# Patient Record
Sex: MALE | Race: WHITE | Employment: OTHER | URBAN - METROPOLITAN AREA
[De-identification: names, ages, dates, MRNs, and addresses within clinical notes are randomized per-mention and may not be internally consistent; named-entity substitution may affect disease eponyms.]

---

## 2017-03-02 ENCOUNTER — OFFICE VISIT (OUTPATIENT)
Dept: FAMILY MEDICINE | Facility: CLINIC | Age: 42
End: 2017-03-02
Payer: COMMERCIAL

## 2017-03-02 VITALS
HEART RATE: 88 BPM | OXYGEN SATURATION: 98 % | BODY MASS INDEX: 28.28 KG/M2 | WEIGHT: 213.38 LBS | HEIGHT: 73 IN | SYSTOLIC BLOOD PRESSURE: 122 MMHG | TEMPERATURE: 97.4 F | DIASTOLIC BLOOD PRESSURE: 86 MMHG

## 2017-03-02 DIAGNOSIS — R59.9 ENLARGED LYMPH NODES: ICD-10-CM

## 2017-03-02 DIAGNOSIS — B35.4 RINGWORM OF BODY: Primary | ICD-10-CM

## 2017-03-02 DIAGNOSIS — F90.0 ADHD, PREDOMINANTLY INATTENTIVE TYPE: ICD-10-CM

## 2017-03-02 LAB
BASOPHILS # BLD AUTO: 0 10E9/L (ref 0–0.2)
BASOPHILS NFR BLD AUTO: 0.4 %
DEPRECATED S PYO AG THROAT QL EIA: NORMAL
DIFFERENTIAL METHOD BLD: NORMAL
EOSINOPHIL # BLD AUTO: 0.1 10E9/L (ref 0–0.7)
EOSINOPHIL NFR BLD AUTO: 1.1 %
ERYTHROCYTE [DISTWIDTH] IN BLOOD BY AUTOMATED COUNT: 13.8 % (ref 10–15)
HCT VFR BLD AUTO: 48.6 % (ref 40–53)
HGB BLD-MCNC: 17.1 G/DL (ref 13.3–17.7)
LYMPHOCYTES # BLD AUTO: 1.8 10E9/L (ref 0.8–5.3)
LYMPHOCYTES NFR BLD AUTO: 33.5 %
MCH RBC QN AUTO: 30 PG (ref 26.5–33)
MCHC RBC AUTO-ENTMCNC: 35.2 G/DL (ref 31.5–36.5)
MCV RBC AUTO: 85 FL (ref 78–100)
MICRO REPORT STATUS: NORMAL
MONOCYTES # BLD AUTO: 0.6 10E9/L (ref 0–1.3)
MONOCYTES NFR BLD AUTO: 11.6 %
NEUTROPHILS # BLD AUTO: 2.9 10E9/L (ref 1.6–8.3)
NEUTROPHILS NFR BLD AUTO: 53.4 %
PLATELET # BLD AUTO: 174 10E9/L (ref 150–450)
RBC # BLD AUTO: 5.7 10E12/L (ref 4.4–5.9)
SPECIMEN SOURCE: NORMAL
WBC # BLD AUTO: 5.5 10E9/L (ref 4–11)

## 2017-03-02 PROCEDURE — 85025 COMPLETE CBC W/AUTO DIFF WBC: CPT | Performed by: PHYSICIAN ASSISTANT

## 2017-03-02 PROCEDURE — 87880 STREP A ASSAY W/OPTIC: CPT | Performed by: PHYSICIAN ASSISTANT

## 2017-03-02 PROCEDURE — 99214 OFFICE O/P EST MOD 30 MIN: CPT | Performed by: PHYSICIAN ASSISTANT

## 2017-03-02 PROCEDURE — 87081 CULTURE SCREEN ONLY: CPT | Performed by: PHYSICIAN ASSISTANT

## 2017-03-02 PROCEDURE — 36415 COLL VENOUS BLD VENIPUNCTURE: CPT | Performed by: PHYSICIAN ASSISTANT

## 2017-03-02 RX ORDER — PRENATAL VIT 91/IRON/FOLIC/DHA 28-975-200
COMBINATION PACKAGE (EA) ORAL 2 TIMES DAILY
Qty: 12 G | Refills: 1 | COMMUNITY
Start: 2017-03-02 | End: 2017-03-16

## 2017-03-02 RX ORDER — DEXTROAMPHETAMINE SACCHARATE, AMPHETAMINE ASPARTATE MONOHYDRATE, DEXTROAMPHETAMINE SULFATE AND AMPHETAMINE SULFATE 5; 5; 5; 5 MG/1; MG/1; MG/1; MG/1
20 CAPSULE, EXTENDED RELEASE ORAL DAILY
Qty: 30 CAPSULE | Refills: 0 | Status: SHIPPED | OUTPATIENT
Start: 2017-03-02 | End: 2017-05-30

## 2017-03-02 NOTE — PROGRESS NOTES
Tejas  Here are your recent results.  They are normal.  If you have any questions please do not hesitate to contact our office via phone ([phone]) or MyChart.    Toña Arias MS, PA-C  [facility]

## 2017-03-02 NOTE — NURSING NOTE
"Chief Complaint   Patient presents with     Mass     lump on right side of neck painful ~3 days        Initial /86  Pulse 88  Temp 97.4  F (36.3  C) (Tympanic)  Ht 6' 1\" (1.854 m)  Wt 213 lb 6 oz (96.8 kg)  SpO2 98%  BMI 28.15 kg/m2 Estimated body mass index is 28.15 kg/(m^2) as calculated from the following:    Height as of this encounter: 6' 1\" (1.854 m).    Weight as of this encounter: 213 lb 6 oz (96.8 kg).  Medication Reconciliation: complete   Jeannine Silva CMA      "

## 2017-03-02 NOTE — MR AVS SNAPSHOT
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  Normal or non-critical lab and imaging results will be communicated to you by MyChart, letter or phone within 4 business days after the clinic has received the results. If you do not hear from us within 7 days, please contact the clinic through [employer] or phone. If you have a critical or abnormal lab result, we will notify you by phone as soon as possible.  Submit refill requests through [employer] or call your pharmacy and they will forward the refill request to us. Please allow 3 business days for your refill to be completed.          Additional Information About Your Visit        MyChart Information     [employer] gives you secure access to your electronic health record. If you see a primary care provider, you can also send messages to your care team and make appointments. If you have questions, please call your primary care clinic.  If you do not have a primary care provider, please call [phone] and they will assist you.        Care EveryWhere ID     This is your Care EveryWhere ID. This could be used by other organizations to access your [place] medical records  [id number]        Your Vitals Were     Pulse Temperature Height Pulse Oximetry BMI (Body Mass Index)       88 97.4  F (36.3  C) (Tympanic) 6' 1\" (1.854 m) 98% 28.15 kg/m2        Blood " Pressure from Last 3 Encounters:   03/02/17 122/86   12/19/16 126/80   08/30/16 110/78    Weight from Last 3 Encounters:   03/02/17 213 lb 6 oz (96.8 kg)   12/19/16 200 lb (90.7 kg)   08/30/16 194 lb (88 kg)              We Performed the Following     Beta strep group A culture     CBC with platelets and differential     Strep, Rapid Screen          Today's Medication Changes          These changes are accurate as of: 3/2/17  9:43 AM.  If you have any questions, ask your nurse or doctor.               Start taking these medicines.        Dose/Directions    amphetamine-dextroamphetamine 20 MG per 24 hr capsule   Commonly known as:  ADDERALL XR   Used for:  ADHD, predominantly inattentive type   Replaces:  amphetamine-dextroamphetamine 30 MG per 24 hr capsule   Started by:  Toña Arias PA-C        Dose:  20 mg   Take 1 capsule (20 mg) by mouth daily   Quantity:  30 capsule   Refills:  0       terbinafine 1 % cream   Commonly known as:  lamISIL AT   Used for:  Ringworm of body   Started by:  Toña Arias PA-C        Apply topically 2 times daily for 14 days   Quantity:  12 g   Refills:  1         Stop taking these medicines if you haven't already. Please contact your care team if you have questions.     amphetamine-dextroamphetamine 30 MG per 24 hr capsule   Commonly known as:  ADDERALL XR   Replaced by:  amphetamine-dextroamphetamine 20 MG per 24 hr capsule   Stopped by:  Toña Arias PA-C                Where to get your medicines      Some of these will need a paper prescription and others can be bought over the counter.  Ask your nurse if you have questions.     Bring a paper prescription for each of these medications     amphetamine-dextroamphetamine 20 MG per 24 hr capsule       You don't need a prescription for these medications     terbinafine 1 % cream                Primary Care Provider Office Phone # Fax #    Candace Gomez [phone]       [place]  [facility] [address] [address]        Thank you!     Thank you for choosing [facility]  for your care. Our goal is always to provide you with excellent care. Hearing back from our patients is one way we can continue to improve our services. Please take a few minutes to complete the written survey that you may receive in the mail after your visit with us. Thank you!             Your Updated Medication List - Protect others around you: Learn how to safely use, store and throw away your medicines at www.disposemymeds.org.          This list is accurate as of: 3/2/17  9:43 AM.  Always use your most recent med list.                   Brand Name Dispense Instructions for use    amphetamine-dextroamphetamine 20 MG per 24 hr capsule    ADDERALL XR    30 capsule    Take 1 capsule (20 mg) by mouth daily       terbinafine 1 % cream    lamISIL AT    12 g    Apply topically 2 times daily for 14 days

## 2017-03-02 NOTE — PROGRESS NOTES
SUBJECTIVE:                                                    Terence Brewster is a 41 year old male who presents to clinic today for the following health issues:    Lump in Neck  Patient presents to clinic today with chief complaint of lump on right side of neck. Onset of symptoms was sudden ~3 days ago. Describes painful lump on right of neck with pain radiating into jaw, face, and temple. He denies sore throat, recent illness, or other known exposures. Has not tried any OTC medication or home remedies to attempt to alleviate pain however states that pain has mildly improved.     Skin Concerns  Patient complains of dry, scaly, and itchy patches on back. Onset of symptoms has been ongoing for >1 month. States that symptoms wax and wane. Patches are localized bilaterally just inferior to shoulder blades. Has tried OTC lotramin with little improvement of symptoms. Wanting to have skin evaluated today.       Problem list and histories reviewed & adjusted, as indicated.  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 intradermal nevus - had as long as can remember      Grief reaction- to wife's death 11/9/2013 from cholangiocarcinoma- ongoing     Elevated blood pressure     Adjustment disorder with mixed anxiety and depressed mood     ED (erectile dysfunction)     Heart palpitations     Lightheadedness     Elevated hemoglobin (H)     Past Surgical History   Procedure Laterality Date     Hernia repair, inguinal rt/lt Left 1978     left inguinal herniorrhaphy  "at 3 year of age     Vasectomy  2005       Social History   Substance Use Topics     Smoking status: Never Smoker     Smokeless tobacco: Never Used     Alcohol use Yes      Comment: occ.     Family History   Problem Relation Age of Onset     Lipids Father          Current Outpatient Prescriptions   Medication Sig Dispense Refill     amphetamine-dextroamphetamine (ADDERALL XR) 30 MG per 24 hr capsule Take 1 capsule (30 mg) by mouth daily 30 capsule 0     No Known Allergies    Reviewed and updated as needed this visit by clinical staff  Tobacco  Allergies  Meds       Reviewed and updated as needed this visit by Provider         ROS:  Constitutional, HEENT, cardiovascular, pulmonary, GI, , musculoskeletal, neuro, skin, endocrine and psych systems are negative, except as otherwise noted.    This document serves as a record of the services and decisions personally performed and made by Toña Arias PA-C. It was created on her behalf by Chata Mccormick, a trained medical scribe. The creation of this document is based the provider's statements to the medical scribe.  Chata Mccormick, March 2, 2017 9:21 AM    OBJECTIVE:                                                    /86  Pulse 88  Temp 97.4  F (36.3  C) (Tympanic)  Ht 6' 1\" (1.854 m)  Wt 213 lb 6 oz (96.8 kg)  SpO2 98%  BMI 28.15 kg/m2  Body mass index is 28.15 kg/(m^2).     GENERAL: healthy, alert and no distress  HENT: ear canals and TM's normal, nose and mouth without ulcers or lesions  NECK: very mild erythema of posterior pharynx, tender palpable anterior right cervical lymph node  RESP: lungs clear to auscultation - no rales, rhonchi or wheezes  CV: regular rate and rhythm, normal S1 S2, no S3 or S4, no murmur, click or rub, no peripheral edema and peripheral pulses strong  SKIN: scaly patches just inferior of bilateral shoulder blades c/w likely ring worm  NEURO: Normal strength and tone, mentation intact and speech normal  PSYCH: mentation appears normal, " affect normal/bright    Diagnostic Test Results:  No results found for this or any previous visit (from the past 24 hour(s)).     ASSESSMENT/PLAN:                                                    Terence was seen today for mass.    Diagnoses and all orders for this visit:    Ringworm of body  Discussed with patient that symptoms are consistent with ring worm. Begin using terbinafine BID as prescribed for 14 days.   -     terbinafine (LAMISIL AT) 1 % cream; Apply topically 2 times daily for 14 days    ADD, predominantly inattentive type- ok to be seen every 6 months- [facility] - 12/19/16  Patient mentions that he has not heart palpitations recently while at rest. As a result he has stopped taking his medication. However patient reports suffering symptoms of ADD as result of discontinuing medication. Restart taking amphetamine-dextroamphetamine however decrease from 30 mg XR to 20 mg XR. [facility].   -     amphetamine-dextroamphetamine (ADDERALL XR) 20 MG per 24 hr capsule; Take 1 capsule (20 mg) by mouth daily    Enlarged lymph nodes - discussed with patient likely viral and to alternate acetaminophen/ ibuprofen and push fluids, continue to ice if tenderness present  -     CBC with platelets and differential  -     Strep, Rapid Screen: negative       The information in this document, created by the medical scribe for me, accurately reflects the services I personally performed and the decisions made by me. I have reviewed and approved this document for accuracy prior to leaving the patient care area. Toña Arias PA-C March 2, 2017 9:20 AM    Toña Arias PA-C  [facility]

## 2017-03-04 LAB
BACTERIA SPEC CULT: NORMAL
MICRO REPORT STATUS: NORMAL
SPECIMEN SOURCE: NORMAL

## 2017-03-14 ENCOUNTER — MYC MEDICAL ADVICE (OUTPATIENT)
Dept: FAMILY MEDICINE | Facility: CLINIC | Age: 42
End: 2017-03-14

## 2017-03-14 DIAGNOSIS — B35.4 TINEA CORPORIS: Primary | ICD-10-CM

## 2017-03-14 RX ORDER — KETOCONAZOLE 20 MG/G
CREAM TOPICAL 2 TIMES DAILY
Qty: 60 G | Refills: 2 | Status: SHIPPED | OUTPATIENT
Start: 2017-03-14 | End: 2017-09-19

## 2017-03-14 NOTE — TELEPHONE ENCOUNTER
LOV 3/2/2017    Please see my chart message below     Please advise     Thank you     Sara Franco RN, BSN  [place] [facility]

## 2017-05-30 ENCOUNTER — MYC MEDICAL ADVICE (OUTPATIENT)
Dept: FAMILY MEDICINE | Facility: CLINIC | Age: 42
End: 2017-05-30

## 2017-05-30 DIAGNOSIS — F90.0 ADHD, PREDOMINANTLY INATTENTIVE TYPE: ICD-10-CM

## 2017-05-30 RX ORDER — DEXTROAMPHETAMINE SACCHARATE, AMPHETAMINE ASPARTATE MONOHYDRATE, DEXTROAMPHETAMINE SULFATE AND AMPHETAMINE SULFATE 5; 5; 5; 5 MG/1; MG/1; MG/1; MG/1
20 CAPSULE, EXTENDED RELEASE ORAL DAILY
Qty: 30 CAPSULE | Refills: 0 | Status: SHIPPED | OUTPATIENT
Start: 2017-05-30 | End: 2017-07-17

## 2017-05-30 NOTE — TELEPHONE ENCOUNTER
Controlled Substance Refill Request for Adderall 20 mg  Problem List Complete:  Yes    Last Written Prescription Date:  3/2/2017  Last Fill Quantity: 30,   # refills: 0    Last Office Visit with [facility] primary care provider: 3/2/2017 with Toña Arias, 10/27/2016 with Dr. Gomez    Clinic visit frequency required: Q 6  months     Future Office visit:     Controlled substance agreement on file: Yes:  Date 3/27/2013.     Processing:  Staff will hand deliver Rx to on-site pharmacy   checked in past 6 months?  Unknown    Emily Curtis, BOBBY, RN, PHN  [facility]

## 2017-05-30 NOTE — TELEPHONE ENCOUNTER
rx at [facility].  Please bring to me and I'll sign when back in office tomorrow.

## 2017-08-27 ENCOUNTER — MYC MEDICAL ADVICE (OUTPATIENT)
Dept: FAMILY MEDICINE | Facility: CLINIC | Age: 42
End: 2017-08-27

## 2017-08-27 DIAGNOSIS — F90.0 ADHD, PREDOMINANTLY INATTENTIVE TYPE: ICD-10-CM

## 2017-08-28 RX ORDER — DEXTROAMPHETAMINE SACCHARATE, AMPHETAMINE ASPARTATE MONOHYDRATE, DEXTROAMPHETAMINE SULFATE AND AMPHETAMINE SULFATE 5; 5; 5; 5 MG/1; MG/1; MG/1; MG/1
20 CAPSULE, EXTENDED RELEASE ORAL DAILY
Qty: 30 CAPSULE | Refills: 0 | Status: SHIPPED | OUTPATIENT
Start: 2017-08-28 | End: 2018-05-30

## 2017-08-28 NOTE — TELEPHONE ENCOUNTER
Controlled Substance Refill Request for Adderall    Problem List Complete:  Yes    Last Written Prescription Date:  7/17/2017  Last Fill Quantity: 30,   # refills: 0    Last Office Visit with [facility] [place] care provider: 3/2/2017    Clinic visit frequency required: Q 6  months     Future Office visit:     Controlled substance agreement on file: Yes:  Date 12/19/2016.     Processing:  Staff will hand deliver Rx to on-site pharmacy     checked in past 6 months?  Yes 7/17/2017     Routing refill request to provider for review/approval because:  Drug not on the [facility] refill protocol   Aline Gorman RN

## 2017-08-29 NOTE — TELEPHONE ENCOUNTER
done x 1 month only. Pt needs recheck office visit before more refills. Please inform pt and  Please assist pt in making appt to be seen.  rx walked over to  [facility] .   Can  through the drive-thru. Please inform patient.

## 2017-08-29 NOTE — TELEPHONE ENCOUNTER
Patient notified via mychart of PCP recommendations below.    Emily Curtis, BS, RN, N  [facility]) [phone]

## 2017-09-18 ENCOUNTER — TELEPHONE (OUTPATIENT)
Dept: FAMILY MEDICINE | Facility: CLINIC | Age: 42
End: 2017-09-18

## 2017-09-18 NOTE — TELEPHONE ENCOUNTER
"Patient calling stating he was at the dentist and had his BP checked and it was 128/99.     Sx: diaphoresis in the middle of the night (onset: couple months), chest pain every once in a while    DENIES: dizziness/lightheadedness, N/V, SOB, difficulty breathing, numbness/tingling, weakness    CHEST PAIN     Onset: few months    Description:   Location:  left side  Character: twinge  Radiation: to L armpit area  Duration: seconds and intermittent (if patient \"pokes\" the area, the pain goes away    Intensity: mild, moderate    Progression of Symptoms:  same and intermittent    Accompanying Signs & Symptoms:  Shortness of breath: no  Sweating: no  Nausea/vomiting: no  Lightheadedness: no  Palpitations: no  Fever/Chills: no   Cough: no  Heartburn: YES - goes away after drinking a glass of cold ice water   History:   Family history of heart disease YES- high cholesterol  Tobacco use: no    Precipitating factors:   Worse with exertion: no  Worse with deep breaths :  YES- sometimes  Related to food: no    Alleviating factors:  None           Therapies Tried and outcome: None     . [phone] (OK to )    Appointment made for 09/19/2017 with WILLIAM FONSECA.   Advised patient that if new or worsening symptoms appear (reviewed new & worsening symptoms) to be seen in the the ER    Elisha Mosher RN  [place] [facility]    "

## 2017-09-19 ENCOUNTER — OFFICE VISIT (OUTPATIENT)
Dept: FAMILY MEDICINE | Facility: CLINIC | Age: 42
End: 2017-09-19
Payer: COMMERCIAL

## 2017-09-19 VITALS
WEIGHT: 204 LBS | HEART RATE: 78 BPM | HEIGHT: 73 IN | TEMPERATURE: 97.9 F | BODY MASS INDEX: 27.04 KG/M2 | SYSTOLIC BLOOD PRESSURE: 130 MMHG | OXYGEN SATURATION: 98 % | DIASTOLIC BLOOD PRESSURE: 86 MMHG

## 2017-09-19 DIAGNOSIS — F90.0 ADHD, PREDOMINANTLY INATTENTIVE TYPE: ICD-10-CM

## 2017-09-19 DIAGNOSIS — R03.0 ELEVATED BLOOD PRESSURE READING WITHOUT DIAGNOSIS OF HYPERTENSION: Primary | ICD-10-CM

## 2017-09-19 DIAGNOSIS — R07.89 ATYPICAL CHEST PAIN: ICD-10-CM

## 2017-09-19 DIAGNOSIS — Z00.00 ENCOUNTER FOR ROUTINE ADULT HEALTH EXAMINATION WITHOUT ABNORMAL FINDINGS: ICD-10-CM

## 2017-09-19 LAB
ALBUMIN SERPL-MCNC: 4.4 G/DL (ref 3.4–5)
ALP SERPL-CCNC: 89 U/L (ref 40–150)
ALT SERPL W P-5'-P-CCNC: 37 U/L (ref 0–70)
ANION GAP SERPL CALCULATED.3IONS-SCNC: 4 MMOL/L (ref 3–14)
AST SERPL W P-5'-P-CCNC: 17 U/L (ref 0–45)
BASOPHILS # BLD AUTO: 0 10E9/L (ref 0–0.2)
BASOPHILS NFR BLD AUTO: 0.2 %
BILIRUB SERPL-MCNC: 1 MG/DL (ref 0.2–1.3)
BUN SERPL-MCNC: 22 MG/DL (ref 7–30)
CALCIUM SERPL-MCNC: 9.7 MG/DL (ref 8.5–10.1)
CHLORIDE SERPL-SCNC: 104 MMOL/L (ref 94–109)
CHOLEST SERPL-MCNC: 186 MG/DL
CO2 SERPL-SCNC: 30 MMOL/L (ref 20–32)
CREAT SERPL-MCNC: 1.07 MG/DL (ref 0.66–1.25)
DIFFERENTIAL METHOD BLD: NORMAL
EOSINOPHIL # BLD AUTO: 0.1 10E9/L (ref 0–0.7)
EOSINOPHIL NFR BLD AUTO: 1.2 %
ERYTHROCYTE [DISTWIDTH] IN BLOOD BY AUTOMATED COUNT: 14.3 % (ref 10–15)
GFR SERPL CREATININE-BSD FRML MDRD: 76 ML/MIN/1.7M2
GLUCOSE SERPL-MCNC: 96 MG/DL (ref 70–99)
HCT VFR BLD AUTO: 49.6 % (ref 40–53)
HDLC SERPL-MCNC: 35 MG/DL
HGB BLD-MCNC: 17.6 G/DL (ref 13.3–17.7)
LDLC SERPL CALC-MCNC: 109 MG/DL
LYMPHOCYTES # BLD AUTO: 2.2 10E9/L (ref 0.8–5.3)
LYMPHOCYTES NFR BLD AUTO: 45 %
MCH RBC QN AUTO: 30 PG (ref 26.5–33)
MCHC RBC AUTO-ENTMCNC: 35.5 G/DL (ref 31.5–36.5)
MCV RBC AUTO: 85 FL (ref 78–100)
MONOCYTES # BLD AUTO: 0.5 10E9/L (ref 0–1.3)
MONOCYTES NFR BLD AUTO: 10.2 %
NEUTROPHILS # BLD AUTO: 2.1 10E9/L (ref 1.6–8.3)
NEUTROPHILS NFR BLD AUTO: 43.4 %
NONHDLC SERPL-MCNC: 151 MG/DL
PLATELET # BLD AUTO: 163 10E9/L (ref 150–450)
POTASSIUM SERPL-SCNC: 4.8 MMOL/L (ref 3.4–5.3)
PROT SERPL-MCNC: 7.9 G/DL (ref 6.8–8.8)
RBC # BLD AUTO: 5.87 10E12/L (ref 4.4–5.9)
SODIUM SERPL-SCNC: 138 MMOL/L (ref 133–144)
TRIGL SERPL-MCNC: 212 MG/DL
TSH SERPL DL<=0.005 MIU/L-ACNC: 2.18 MU/L (ref 0.4–4)
WBC # BLD AUTO: 4.9 10E9/L (ref 4–11)

## 2017-09-19 PROCEDURE — 80050 GENERAL HEALTH PANEL: CPT | Performed by: PHYSICIAN ASSISTANT

## 2017-09-19 PROCEDURE — 93000 ELECTROCARDIOGRAM COMPLETE: CPT | Performed by: PHYSICIAN ASSISTANT

## 2017-09-19 PROCEDURE — 99214 OFFICE O/P EST MOD 30 MIN: CPT | Performed by: PHYSICIAN ASSISTANT

## 2017-09-19 PROCEDURE — 80061 LIPID PANEL: CPT | Performed by: PHYSICIAN ASSISTANT

## 2017-09-19 PROCEDURE — 36415 COLL VENOUS BLD VENIPUNCTURE: CPT | Performed by: PHYSICIAN ASSISTANT

## 2017-09-19 RX ORDER — DEXTROAMPHETAMINE SACCHARATE, AMPHETAMINE ASPARTATE MONOHYDRATE, DEXTROAMPHETAMINE SULFATE AND AMPHETAMINE SULFATE 2.5; 2.5; 2.5; 2.5 MG/1; MG/1; MG/1; MG/1
10 CAPSULE, EXTENDED RELEASE ORAL DAILY
Qty: 30 CAPSULE | Refills: 0 | Status: SHIPPED | OUTPATIENT
Start: 2017-09-19 | End: 2018-05-30

## 2017-09-19 NOTE — NURSING NOTE
"Chief Complaint   Patient presents with     Chest Pain     Hypertension     Recheck Medication       Initial /86 (BP Location: Left arm, Patient Position: Chair, Cuff Size: Adult Large)  Pulse 78  Temp 97.9  F (36.6  C) (Oral)  Ht 6' 1\" (1.854 m)  Wt 204 lb (92.5 kg)  SpO2 98%  BMI 26.91 kg/m2 Estimated body mass index is 26.91 kg/(m^2) as calculated from the following:    Height as of this encounter: 6' 1\" (1.854 m).    Weight as of this encounter: 204 lb (92.5 kg).  Medication Reconciliation: complete   Csaba Mlnarik CMA    "

## 2017-09-19 NOTE — PROGRESS NOTES
"  SUBJECTIVE:                                                    Terence Brewster is a 42 year old male who presents to clinic today for the following health issues:      Patient calling stating he was at the dentist and had his BP checked and it was 128/99. ---  Did take Adderall and had 2 cups of coffee prior to dentist appt.     Sx: diaphoresis in the middle of the night (onset: couple months), chest pain every once in a while     DENIES: dizziness/lightheadedness, N/V, SOB, difficulty breathing, numbness/tingling, weakness     CHEST PAIN     Onset: few months    Description:   Location:  left side  Character: twinge  Radiation: to L armpit area  Duration: seconds and intermittent (if patient \"pokes\" the area, the pain goes away    Intensity: mild, moderate    Progression of Symptoms:  same and intermittent    Accompanying Signs & Symptoms:  Shortness of breath: no  Sweating: no  Nausea/vomiting: no  Lightheadedness: no  Palpitations: no  Fever/Chills: no   Cough: no  Heartburn: YES - goes away after drinking a glass of cold ice water   History:   Family history of heart disease YES- high cholesterol  Tobacco use: no    Precipitating factors:   Worse with exertion: no  Worse with deep breaths :  YES- sometimes  Related to food: no    Alleviating factors:  None           Therapies Tried and outcome: None      Ph. [phone] (OK to )     Appointment made for 09/19/2017 with WILLIAM FONSECA.   Advised patient that if new or worsening symptoms appear (reviewed new & worsening symptoms) to be seen in the the ER     Elisha Mosher RN  [place] [facility]      Medication Followup of Adderall    Taking Medication as prescribed: yes    Side Effects:  None    Medication Helping Symptoms:  yes      Patient reports that he was at the dentist yesterday and his blood pressure was high so he was told to come into the clinic for further evaluation.      He says that in the questioning from the nurse on the phone he realized that he " "does get occasional chest pains or twinges.  He says they are in a specific spot and it feels like that area is being pressed with his finger.  He denies any chest pressure or feeling like a weight on his chest.  He also denies any heart palpitations, shortness of breath or diaphoresis.       Patient reports that he does work out once a week and does lift weights.  He does not have any trouble getting through an exercise and denies any symptoms during the workout.      Patient reports that he does drink alcohol and averages about 1-2 drinks a day.  He does have days where he doesn't drink at all.        Patient reports that he does have some high blood pressure in his family.      Problem list and histories reviewed & adjusted, as indicated.  Additional history: as documented      ROS:  Constitutional, HEENT, cardiovascular, pulmonary, GI, , musculoskeletal, neuro, skin, endocrine and psych systems are negative, except as otherwise noted.    OBJECTIVE:                                                    /86 (BP Location: Left arm, Patient Position: Chair, Cuff Size: Adult Large)  Pulse 78  Temp 97.9  F (36.6  C) (Oral)  Ht 6' 1\" (1.854 m)  Wt 204 lb (92.5 kg)  SpO2 98%  BMI 26.91 kg/m2  Body mass index is 26.91 kg/(m^2).  GENERAL: healthy, alert and no distress  EYES: Eyes grossly normal to inspection, PERRL and conjunctivae and sclerae normal  RESP: lungs clear to auscultation - no rales, rhonchi or wheezes  CV: regular rate and rhythm, normal S1 S2, no S3 or S4, no murmur, click or rub, no peripheral edema and peripheral pulses strong  MS: no gross musculoskeletal defects noted, no edema  NEURO: Normal strength and tone, mentation intact and speech normal  PSYCH: mentation appears normal, affect normal/bright    Diagnostic Test Results:  EKG - NSR and appears stable from prior EKG's in chart.       ASSESSMENT/PLAN:                                                      Terence was seen today for chest " pain, hypertension and recheck medication.    Diagnoses and all orders for this visit:    Elevated blood pressure reading without diagnosis of hypertension    Atypical chest pain  -     EKG 12-lead complete w/read - Clinics    ADD, predominantly inattentive type- ok to be seen every 6 months- [facility] - 12/19/16  -     amphetamine-dextroamphetamine (ADDERALL XR) 10 MG per 24 hr capsule; Take 1 capsule (10 mg) by mouth daily    Encounter for routine adult health examination without abnormal findings  -     Lipid panel reflex to direct LDL  -     Comprehensive metabolic panel  -     CBC with platelets differential  -     TSH with free T4 reflex      - Today in clinic the patient's BP is within normal limits.  I took a second BP reading that was 130/92 and a third reading that was 126/96.  Patient was advised to check his BP outside of clinic.  He can stop at the [facility], or most grocery stores.  He was asked to record the readings and to make a followup appointment if they are over 140/90.    - Patient is on Adderall and has been on this medication for many years.  Before changing to Adderall, he was on Ritalin in his high school years.    - We have dropped his dose to 10 mg once daily to see if the BP can improve some.  Patient reports that the Adderall does help his symptoms and he does not feel like he can go off of it, but he is willing to decrease the dose.    - Patient is fasting today and is due for routine physical lab work.  Labs ordered and patient advised to have the labs drawn before leaving clinic today.      - Patient is advised to seek immediate medical attention if symptoms change or worsen in any way.      -- Patient agrees with and understands the plan today.   -- Over 25 minutes spent with the patient and over 50% of the visit was spent on counseling and coordination of care.       See Patient Instructions:  We will have you change to the 10 mg tabs of the Adderall instead of the 20 mg.       Please record your blood pressure outside of clinic for the next week and followup if the pressure is over 140/90.  Please seek immediate medical attention if symptoms change or worsen or your blood pressure is over 180/120.      Please get your labs done before you leave today.          Vicki Munoz PA-C    [facility]

## 2017-09-19 NOTE — PATIENT INSTRUCTIONS
We will have you changes to the 10 mg tabs of the Adderall instead of the 20 mg.      Please record your blood pressure outside of clinic for the next week and followup if the pressure is over 140/90.  Please seek immediate medical attention if symptoms change or worsen or your blood pressure is over 180/120.      Please get your labs done before you leave today.        Controlling High Blood Pressure  High blood pressure (hypertension) is often called the silent killer. This is because many people who have it don t know it. High blood pressure is defined as 140/90 mm Hg or higher. Know your blood pressure and remember to check it regularly. Doing so can save your life. Here are some things you can do to help control your blood pressure.    Choose heart-healthy foods    Select low-salt, low-fat foods. Limit sodium intake to 2,400 mg per day or the amount suggested by your healthcare provider.    Limit canned, dried, cured, packaged, and fast foods. These can contain a lot of salt.    Eat 8 to 10 servings of fruits and vegetables every day.    Choose lean meats, fish, or chicken.    Eat whole-grain pasta, brown rice, and beans.    Eat 2 to 3 servings of low-fat or fat-free dairy products.    Ask your doctor about the DASH eating plan. This plan helps reduce blood pressure.    When you go to a restaurant, ask that your meal be prepared with no added salt.  Maintain a healthy weight    Ask your healthcare provider how many calories to eat a day. Then stick to that number.    Ask your healthcare provider what weight range is healthiest for you. If you are overweight, a weight loss of only 3% to 5% of your body weight can help lower blood pressure. Generally, a good weight loss goal is to lose 10% of your body weight in a year.    Limit snacks and sweets.    Get regular exercise.  Get up and get active    Choose activities you enjoy. Find ones you can do with friends or family. This includes bicycling, dancing, walking,  and jogging.    Park farther away from building entrances.    Use stairs instead of the elevator.    When you can, walk or bike instead of driving.    [place] leaves, garden, or do household repairs.    Be active at a moderate to vigorous level of physical activity for at least 40 minutes for a minimum of 3 to 4 days a week.   Manage stress    Make time to relax and enjoy life. Find time to laugh.    Communicate your concerns with your loved ones and your healthcare provider.    Visit with family and friends, and keep up with hobbies.  Limit alcohol and quit smoking    Men should have no more than 2 drinks per day.    Women should have no more than 1 drink per day.    Talk with your healthcare provider about quitting smoking. Smoking significantly increases your risk for heart disease and stroke. Ask your healthcare provider about community smoking cessation programs and other options.  Medicines  If lifestyle changes aren t enough, your healthcare provider may prescribe high blood pressure medicine. Take all medicines as prescribed. If you have any questions about your medicines, ask your healthcare provider before stopping or changing them.   Date Last Reviewed: 4/27/2016 2000-2017 [employer]. [address] [place] [address], [place] [address]. All rights reserved. This information is not intended as a substitute for professional medical care. Always follow your healthcare professional's instructions.

## 2017-09-19 NOTE — MR AVS SNAPSHOT
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      Please record your blood pressure outside of clinic for the next week and followup if the pressure is over 140/90.  Please seek immediate medical attention if symptoms change or worsen or your blood pressure is over 180/120.      Please get your labs done before you leave today.        Controlling High Blood Pressure  High blood pressure (hypertension) is often called the silent killer. This is because many people who have it don t know it. High blood pressure is defined as 140/90 mm Hg or higher. Know your blood pressure and remember to check it regularly. Doing so can save your life. Here are some things you can do to help control your blood pressure.    Choose heart-healthy foods    Select low-salt, low-fat foods. Limit sodium intake to 2,400 mg per day or the amount suggested by your healthcare provider.    Limit canned, dried, cured, packaged, and fast foods. These can contain a lot of salt.    Eat 8 to 10 servings of fruits and vegetables every day.    Choose lean meats, fish, or chicken.    Eat whole-grain pasta, brown rice, and beans.    Eat 2 to 3 servings of low-fat or fat-free dairy products.    Ask your doctor about the DASH eating plan. This plan helps reduce blood pressure.    When you go to a restaurant, ask that your meal be prepared with no added salt.  Maintain a healthy weight    Ask your  healthcare provider how many calories to eat a day. Then stick to that number.    Ask your healthcare provider what weight range is healthiest for you. If you are overweight, a weight loss of only 3% to 5% of your body weight can help lower blood pressure. Generally, a good weight loss goal is to lose 10% of your body weight in a year.    Limit snacks and sweets.    Get regular exercise.  Get up and get active    Choose activities you enjoy. Find ones you can do with friends or family. This includes bicycling, dancing, walking, and jogging.    Park farther away from building entrances.    Use stairs instead of the elevator.    When you can, walk or bike instead of driving.    [place] leaves, garden, or do household repairs.    Be active at a moderate to vigorous level of physical activity for at least 40 minutes for a minimum of 3 to 4 days a week.   Manage stress    Make time to relax and enjoy life. Find time to laugh.    Communicate your concerns with your loved ones and your healthcare provider.    Visit with family and friends, and keep up with hobbies.  Limit alcohol and quit smoking    Men should have no more than 2 drinks per day.    Women should have no more than 1 drink per day.    Talk with your healthcare provider about quitting smoking. Smoking significantly increases your risk for heart disease and stroke. Ask your healthcare provider about community smoking cessation programs and other options.  Medicines  If lifestyle changes aren t enough, your healthcare provider may prescribe high blood pressure medicine. Take all medicines as prescribed. If you have any questions about your medicines, ask your healthcare provider before stopping or changing them.   Date Last Reviewed: 4/27/2016 2000-2017 [employer]. [address] [place] [address], [place] [address]. All rights reserved. This information is not intended as a substitute for professional medical care. Always follow your healthcare  "professional's instructions.                Follow-ups after your visit        Who to contact     If you have questions or need follow up information about today's clinic visit or your schedule please contact [facility] directly at [phone].  Normal or non-critical lab and imaging results will be communicated to you by MyChart, letter or phone within 4 business days after the clinic has received the results. If you do not hear from us within 7 days, please contact the clinic through [employer] or phone. If you have a critical or abnormal lab result, we will notify you by phone as soon as possible.  Submit refill requests through [employer] or call your pharmacy and they will forward the refill request to us. Please allow 3 business days for your refill to be completed.          Additional Information About Your Visit        [employer] Information     [employer] gives you secure access to your electronic health record. If you see a primary care provider, you can also send messages to your care team and make appointments. If you have questions, please call your primary care clinic.  If you do not have a primary care provider, please call [phone] and they will assist you.        Care EveryWhere ID     This is your Care EveryWhere ID. This could be used by other organizations to access your [facility] medical records  [id number]        Your Vitals Were     Pulse Temperature Height Pulse Oximetry BMI (Body Mass Index)       78 97.9  F (36.6  C) (Oral) 6' 1\" (1.854 m) 98% 26.91 kg/m2        Blood Pressure from Last 3 Encounters:   09/19/17 130/86   03/02/17 122/86   12/19/16 126/80    Weight from Last 3 Encounters:   09/19/17 204 lb (92.5 kg)   03/02/17 213 lb 6 oz (96.8 kg)   12/19/16 200 lb (90.7 kg)              We Performed the Following     CBC with platelets differential     Comprehensive metabolic panel     EKG 12-lead complete w/read - Clinics     Lipid panel reflex to direct LDL     TSH with free T4 " reflex          Today's Medication Changes          These changes are accurate as of: 9/19/17  8:39 AM.  If you have any questions, ask your nurse or doctor.               These medicines have changed or have updated prescriptions.        Dose/Directions    * amphetamine-dextroamphetamine 20 MG per 24 hr capsule   Commonly known as:  ADDERALL XR   This may have changed:  Another medication with the same name was added. Make sure you understand how and when to take each.   Used for:  ADHD, predominantly inattentive type   Changed by:  Candace Gomez MD        Dose:  20 mg   Take 1 capsule (20 mg) by mouth daily   Quantity:  30 capsule   Refills:  0       * amphetamine-dextroamphetamine 10 MG per 24 hr capsule   Commonly known as:  ADDERALL XR   This may have changed:  You were already taking a medication with the same name, and this prescription was added. Make sure you understand how and when to take each.   Used for:  ADHD, predominantly inattentive type   Changed by:  Vicki Munoz PA-C        Dose:  10 mg   Take 1 capsule (10 mg) by mouth daily   Quantity:  30 capsule   Refills:  0       * Notice:  This list has 2 medication(s) that are the same as other medications prescribed for you. Read the directions carefully, and ask your doctor or other care provider to review them with you.         Where to get your medicines      Some of these will need a paper prescription and others can be bought over the counter.  Ask your nurse if you have questions.     Bring a paper prescription for each of these medications     amphetamine-dextroamphetamine 10 MG per 24 hr capsule                Primary Care Provider Office Phone # Fax #    Candace Gomez [phone]       [address] [place] [address]        Equal Access to Services     [facility] : Tariq Trivedi, martita luqadaha, qacorey belle . So [facility]  [phone].    ATENCIÓN: Si lindsay rosario, tiene a castro disposición servicios gratuitos de asistencia lingüística. Nilo rachel [phone].    We comply with applicable federal civil rights laws and [place] laws. We do not discriminate on the basis of race, color, national origin, age, disability sex, sexual orientation or gender identity.            Thank you!     Thank you for choosing [facility]  for your care. Our goal is always to provide you with excellent care. Hearing back from our patients is one way we can continue to improve our services. Please take a few minutes to complete the written survey that you may receive in the mail after your visit with us. Thank you!             Your Updated Medication List - Protect others around you: Learn how to safely use, store and throw away your medicines at www.disposemymeds.org.          This list is accurate as of: 9/19/17  8:39 AM.  Always use your most recent med list.                   Brand Name Dispense Instructions for use Diagnosis    * amphetamine-dextroamphetamine 20 MG per 24 hr capsule    ADDERALL XR    30 capsule    Take 1 capsule (20 mg) by mouth daily    ADHD, predominantly inattentive type       * amphetamine-dextroamphetamine 10 MG per 24 hr capsule    ADDERALL XR    30 capsule    Take 1 capsule (10 mg) by mouth daily    ADHD, predominantly inattentive type       * Notice:  This list has 2 medication(s) that are the same as other medications prescribed for you. Read the directions carefully, and ask your doctor or other care provider to review them with you.

## 2017-09-22 NOTE — PROGRESS NOTES
Noman Lazaro ,    The results from your recent lab work are within normal limits, but the cholesterol is mildly elevated, please see note below.      -Liver and gallbladder tests are normal. (ALT,AST, Alk phos, bilirubin), kidney function is normal (Cr, GFR), Sodium is normal, Potassium is normal, Calcium is normal, Glucose is normal (diabetes screening test).     -LDL(bad) cholesterol level is elevated, HDL(good) cholesterol level is low and your triglycerides are elevated which can increase your heart disease risk.  A diet high in fat and simple carbohydrates, genetics and being overweight can contribute to this. ADVISE: Exercise, a low fat, low carbohydrate diet, weight control, and omega-3 fatty acids (fish oil) 0354-1010 mg daily are helpful to improve this.  Rechecking your fasting cholesterol panel in 12 months is recommended (Lipid w/ LDL reflex, DX: hyperlipidemia)    -TSH (thyroid stimulating hormone) level is normal which indicates normal thyroid function.    -Normal red blood cell (hgb) levels, normal white blood cell count and normal platelet levels.      Thank you for choosing [facility] for your health care needs,      Vicki Munoz PA-C    The 10-year ASCVD risk score ([place] JAY Jr, et al., 2013) is: 1.7%    Values used to calculate the score:      Age: 42 years      Sex: Male      Is Non- : No      Diabetic: No      Tobacco smoker: No      Systolic Blood Pressure: 130 mmHg      Is BP treated: No      HDL Cholesterol: 35 mg/dL      Total Cholesterol: 186 mg/dL

## 2018-05-30 ENCOUNTER — OFFICE VISIT (OUTPATIENT)
Dept: FAMILY MEDICINE | Facility: CLINIC | Age: 43
End: 2018-05-30
Payer: COMMERCIAL

## 2018-05-30 VITALS
HEIGHT: 73 IN | DIASTOLIC BLOOD PRESSURE: 78 MMHG | HEART RATE: 83 BPM | TEMPERATURE: 97.1 F | BODY MASS INDEX: 28.03 KG/M2 | SYSTOLIC BLOOD PRESSURE: 116 MMHG | WEIGHT: 211.5 LBS | OXYGEN SATURATION: 97 %

## 2018-05-30 DIAGNOSIS — Z11.4 SCREENING FOR HIV (HUMAN IMMUNODEFICIENCY VIRUS): ICD-10-CM

## 2018-05-30 DIAGNOSIS — F90.0 ADHD, PREDOMINANTLY INATTENTIVE TYPE: ICD-10-CM

## 2018-05-30 DIAGNOSIS — R71.8 INCREASED MEAN CORPUSCULAR VOLUME: ICD-10-CM

## 2018-05-30 DIAGNOSIS — Z13.0 SCREENING FOR DEFICIENCY ANEMIA: ICD-10-CM

## 2018-05-30 DIAGNOSIS — Z00.00 ROUTINE GENERAL MEDICAL EXAMINATION AT A HEALTH CARE FACILITY: Primary | ICD-10-CM

## 2018-05-30 DIAGNOSIS — Z12.5 SPECIAL SCREENING FOR MALIGNANT NEOPLASM OF PROSTATE: ICD-10-CM

## 2018-05-30 DIAGNOSIS — Z13.220 LIPID SCREENING: ICD-10-CM

## 2018-05-30 DIAGNOSIS — Z13.1 SCREENING FOR DIABETES MELLITUS: ICD-10-CM

## 2018-05-30 LAB
ALBUMIN SERPL-MCNC: 4.6 G/DL (ref 3.4–5)
ALP SERPL-CCNC: 87 U/L (ref 40–150)
ALT SERPL W P-5'-P-CCNC: 39 U/L (ref 0–70)
ANION GAP SERPL CALCULATED.3IONS-SCNC: 10 MMOL/L (ref 3–14)
AST SERPL W P-5'-P-CCNC: 23 U/L (ref 0–45)
BILIRUB SERPL-MCNC: 1 MG/DL (ref 0.2–1.3)
BUN SERPL-MCNC: 12 MG/DL (ref 7–30)
CALCIUM SERPL-MCNC: 9.4 MG/DL (ref 8.5–10.1)
CHLORIDE SERPL-SCNC: 105 MMOL/L (ref 94–109)
CHOLEST SERPL-MCNC: 206 MG/DL
CO2 SERPL-SCNC: 26 MMOL/L (ref 20–32)
CREAT SERPL-MCNC: 1.05 MG/DL (ref 0.66–1.25)
DEPRECATED CALCIDIOL+CALCIFEROL SERPL-MC: 24 UG/L (ref 20–75)
ERYTHROCYTE [DISTWIDTH] IN BLOOD BY AUTOMATED COUNT: 14.3 % (ref 10–15)
FERRITIN SERPL-MCNC: 288 NG/ML (ref 26–388)
GFR SERPL CREATININE-BSD FRML MDRD: 78 ML/MIN/1.7M2
GLUCOSE SERPL-MCNC: 97 MG/DL (ref 70–99)
HCT VFR BLD AUTO: 50.1 % (ref 40–53)
HDLC SERPL-MCNC: 29 MG/DL
HGB BLD-MCNC: 17.3 G/DL (ref 13.3–17.7)
HIV 1+2 AB+HIV1 P24 AG SERPL QL IA: NONREACTIVE
LDLC SERPL CALC-MCNC: 124 MG/DL
MCH RBC QN AUTO: 29.3 PG (ref 26.5–33)
MCHC RBC AUTO-ENTMCNC: 34.5 G/DL (ref 31.5–36.5)
MCV RBC AUTO: 85 FL (ref 78–100)
NONHDLC SERPL-MCNC: 177 MG/DL
PLATELET # BLD AUTO: 158 10E9/L (ref 150–450)
POTASSIUM SERPL-SCNC: 4.5 MMOL/L (ref 3.4–5.3)
PROT SERPL-MCNC: 7.9 G/DL (ref 6.8–8.8)
PSA SERPL-ACNC: 0.64 UG/L (ref 0–4)
RBC # BLD AUTO: 5.91 10E12/L (ref 4.4–5.9)
SODIUM SERPL-SCNC: 141 MMOL/L (ref 133–144)
TRIGL SERPL-MCNC: 266 MG/DL
TSH SERPL DL<=0.005 MIU/L-ACNC: 1.57 MU/L (ref 0.4–4)
VIT B12 SERPL-MCNC: 336 PG/ML (ref 193–986)
WBC # BLD AUTO: 4.9 10E9/L (ref 4–11)

## 2018-05-30 PROCEDURE — 99396 PREV VISIT EST AGE 40-64: CPT | Performed by: PHYSICIAN ASSISTANT

## 2018-05-30 PROCEDURE — 84443 ASSAY THYROID STIM HORMONE: CPT | Performed by: PHYSICIAN ASSISTANT

## 2018-05-30 PROCEDURE — 82306 VITAMIN D 25 HYDROXY: CPT | Performed by: PHYSICIAN ASSISTANT

## 2018-05-30 PROCEDURE — 80053 COMPREHEN METABOLIC PANEL: CPT | Performed by: PHYSICIAN ASSISTANT

## 2018-05-30 PROCEDURE — 80061 LIPID PANEL: CPT | Performed by: PHYSICIAN ASSISTANT

## 2018-05-30 PROCEDURE — G0103 PSA SCREENING: HCPCS | Performed by: PHYSICIAN ASSISTANT

## 2018-05-30 PROCEDURE — 99000 SPECIMEN HANDLING OFFICE-LAB: CPT | Performed by: PHYSICIAN ASSISTANT

## 2018-05-30 PROCEDURE — 82728 ASSAY OF FERRITIN: CPT | Performed by: PHYSICIAN ASSISTANT

## 2018-05-30 PROCEDURE — 84207 ASSAY OF VITAMIN B-6: CPT | Mod: 90 | Performed by: PHYSICIAN ASSISTANT

## 2018-05-30 PROCEDURE — 85027 COMPLETE CBC AUTOMATED: CPT | Performed by: PHYSICIAN ASSISTANT

## 2018-05-30 PROCEDURE — 36415 COLL VENOUS BLD VENIPUNCTURE: CPT | Performed by: PHYSICIAN ASSISTANT

## 2018-05-30 PROCEDURE — 87389 HIV-1 AG W/HIV-1&-2 AB AG IA: CPT | Performed by: PHYSICIAN ASSISTANT

## 2018-05-30 PROCEDURE — 82607 VITAMIN B-12: CPT | Performed by: PHYSICIAN ASSISTANT

## 2018-05-30 RX ORDER — OMEGA-3 FATTY ACIDS/FISH OIL 300-1000MG
2 CAPSULE ORAL DAILY
Qty: 90 CAPSULE | COMMUNITY
Start: 2018-05-30 | End: 2019-05-21

## 2018-05-30 NOTE — MR AVS SNAPSHOT
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   o   Discuss preventive care.    o   Review your medicines if your doctor has prescribed any.    You should be tested each year for STDs (sexually transmitted diseases) if you re at risk.     Have a cholesterol test every 5 years.     Have a colonoscopy (test for colon cancer) if someone in your family has had colon cancer or polyps before age 50.     After age 45, have a diabetes test (fasting glucose). If you are at risk for diabetes, you should have this test every 3 years.      Talk with your health care provider about whether or not a prostate cancer screening test (PSA) is right for you.    Shots: Get a flu shot each year. Get a tetanus shot every 10 years.     Nutrition:    Eat at least 5 servings of fruits and vegetables daily.     Eat whole-grain bread, whole-wheat pasta and brown rice instead of white grains and rice.     Talk to your provider about Calcium and Vitamin D.     Lifestyle    Exercise for at least 150 minutes a week (30 minutes a day, 5 days a week). This will help you control your weight and prevent disease.     Limit alcohol to one  "drink per day.     No smoking.     Wear sunscreen to prevent skin cancer.     See your dentist every six months for an exam and cleaning.              Follow-ups after your visit        Who to contact     If you have questions or need follow up information about today's clinic visit or your schedule please contact [facility] directly at [phone].  Normal or non-critical lab and imaging results will be communicated to you by [employer], letter or phone within 4 business days after the clinic has received the results. If you do not hear from us within 7 days, please contact the clinic through [employer] or phone. If you have a critical or abnormal lab result, we will notify you by phone as soon as possible.  Submit refill requests through [employer] or call your pharmacy and they will forward the refill request to us. Please allow 3 business days for your refill to be completed.          Additional Information About Your Visit        [employer] Information     [employer] gives you secure access to your electronic health record. If you see a primary care provider, you can also send messages to your care team and make appointments. If you have questions, please call your primary care clinic.  If you do not have a primary care provider, please call [phone] and they will assist you.        Care EveryWhere ID     This is your Care EveryWhere ID. This could be used by other organizations to access your [facility] medical records  [id number]        Your Vitals Were     Pulse Temperature Height Pulse Oximetry BMI (Body Mass Index)       83 97.1  F (36.2  C) (Tympanic) 6' 1\" (1.854 m) 97% 27.9 kg/m2        Blood Pressure from Last 3 Encounters:   05/30/18 116/78   09/19/17 130/86   03/02/17 122/86    Weight from Last 3 Encounters:   05/30/18 211 lb 8 oz (95.9 kg)   09/19/17 204 lb (92.5 kg)   03/02/17 213 lb 6 oz (96.8 kg)              We Performed the Following     CBC with platelets     Comprehensive metabolic " panel     Ferritin     HIV Screening     Lipid panel reflex to direct LDL Fasting     PSA, screen     TSH with free T4 reflex     Vitamin B12     Vitamin B6     Vitamin D Deficiency        Primary Care Provider Office Phone # Fax #    Candace Gomez [phone]       [address] [place] [address]        Equal Access to Services     ROHAN DUTTA : Hadii aad ku hadasho Soomaali, waaxda luqadaha, qaybta kaalmada adeegyada, waxay nanettein hayaan adepeggy guerramelinacaden hall. So [facility] [phone].    ATENCIÓN: Si habla español, tiene a castro disposición servicios gratuitos de asistencia lingüística. Llame al [phone].    We comply with applicable federal civil rights laws and [place] laws. We do not discriminate on the basis of race, color, national origin, age, disability, sex, sexual orientation, or gender identity.            Thank you!     Thank you for choosing [facility]  for your care. Our goal is always to provide you with excellent care. Hearing back from our patients is one way we can continue to improve our services. Please take a few minutes to complete the written survey that you may receive in the mail after your visit with us. Thank you!             Your Updated Medication List - Protect others around you: Learn how to safely use, store and throw away your medicines at www.disposemymeds.org.          This list is accurate as of 5/30/18  9:32 AM.  Always use your most recent med list.                   Brand Name Dispense Instructions for use Diagnosis    omega 3 1000 MG Caps     90 capsule    Take 2 g by mouth daily    Routine general medical examination at a health care facility

## 2018-05-30 NOTE — PROGRESS NOTES
SUBJECTIVE:   CC: Terence Brewster is an 43 year old male who presents for preventative health visit.     Healthy Habits:    Do you get at least three servings of calcium containing foods daily (dairy, green leafy vegetables, etc.)? yes    Amount of exercise or daily activities, outside of work: 1-2 day(s) per week    Problems taking medications regularly not applicable    Medication side effects: No    Have you had an eye exam in the past two years? no    Do you see a dentist twice per year? yes    Do you have sleep apnea, excessive snoring or daytime drowsiness?no     Hypertension Follow-up  Terence has a PMH significant for elevated blood pressure while he was on Adderall. Due to this he discontinued the medication in September of 2017. He has been checking his BP at the grocery store and reports that his BP has not been over 130/80 in the evenings. He reports that he can tell that he has less energy, shortened attention span, and more difficult task completion since discontinuing the stimulant but relates that it is tolerable.    Outpatient blood pressures are being checked at store.  Results are 130/80 on average.    Low Salt Diet: low salt    BP Readings from Last 6 Encounters:   05/30/18 116/78   09/19/17 130/86   03/02/17 122/86   12/19/16 126/80   08/30/16 110/78   10/08/15 118/64     Today's PHQ-2 Score:   PHQ-2 ( 1999 [employer]) 5/30/2018 10/8/2015   Q1: Little interest or pleasure in doing things 0 0   Q2: Feeling down, depressed or hopeless 0 0   PHQ-2 Score 0 0     Abuse: Current or Past(Physical, Sexual or Emotional)- No  Do you feel safe in your environment - Yes    Social History   Substance Use Topics     Smoking status: Never Smoker     Smokeless tobacco: Never Used     Alcohol use 6.0 oz/week     10 Standard drinks or equivalent per week      If you drink alcohol do you typically have >3 drinks per day or >7 drinks per week? No                      Last PSA: No results found for: PSA    Reviewed  orders with patient. Reviewed health maintenance and updated orders accordingly - Yes  BP Readings from Last 3 Encounters:   05/30/18 116/78   09/19/17 130/86   03/02/17 122/86    Wt Readings from Last 3 Encounters:   05/30/18 211 lb 8 oz (95.9 kg)   09/19/17 204 lb (92.5 kg)   03/02/17 213 lb 6 oz (96.8 kg)           Patient Active Problem List   Diagnosis     Family history of hypercholesterolemia     ADD, predominantly inattentive type- ok to be seen every 6 months- [facility] - 12/19/16     Acute reaction to stress- to wife's death-- very sudden- 11/9/2013     Disorder of male genital organs     Neoplasm of uncertain behavior of skin - left temple - 5mm diameter - ? intradermal nevus - had as long as can remember      Grief reaction- to wife's death 11/9/2013 from cholangiocarcinoma- ongoing     Elevated blood pressure     Adjustment disorder with mixed anxiety and depressed mood     ED (erectile dysfunction)     Heart palpitations     Lightheadedness     Elevated hemoglobin (H)     Past Surgical History:   Procedure Laterality Date     HERNIA REPAIR, INGUINAL RT/LT Left 1978    left inguinal herniorrhaphy at 3 year of age     VASECTOMY  2005     vasectomy  2016    reversal       Social History   Substance Use Topics     Smoking status: Never Smoker     Smokeless tobacco: Never Used     Alcohol use 6.0 oz/week     10 Standard drinks or equivalent per week     Family History   Problem Relation Age of Onset     Lipids Father      CANCER Paternal Grandfather      Colon Cancer No family hx of      Prostate Cancer No family hx of          Current Outpatient Prescriptions   Medication Sig Dispense Refill     omega 3 1000 MG CAPS Take 2 g by mouth daily 90 capsule      No Known Allergies    Reviewed and updated as needed this visit by clinical staff  Tobacco  Allergies  Meds  Problems  Med Hx  Surg Hx  Fam Hx  Soc Hx        Reviewed and updated as needed this visit by Provider  Tobacco  Allergies  Meds  Problems   "Med Hx  Surg Hx  Fam Hx  Soc Hx         Past Medical History:   Diagnosis Date     Chest pain     normal cardiac workup 2013     Closed fracture of head of radius      Elevated hemoglobin (H)     unclear etiology     Heart palpitations      Lightheadedness       Past Surgical History:   Procedure Laterality Date     HERNIA REPAIR, INGUINAL RT/LT Left 1978    left inguinal herniorrhaphy at 3 year of age     VASECTOMY  2005     vasectomy  2016    reversal     ROS:  CONSTITUTIONAL: NEGATIVE for fever, chills, change in weight  INTEGUMENTARY/SKIN: NEGATIVE for worrisome rashes, moles or lesions  EYES: NEGATIVE for vision changes or irritation  ENT: NEGATIVE for ear, mouth and throat problems  RESP: NEGATIVE for significant cough or SOB  CV: NEGATIVE for chest pain, palpitations or peripheral edema  GI: NEGATIVE for nausea, abdominal pain, heartburn, or change in bowel habits   male: negative for dysuria, hematuria, decreased urinary stream, erectile dysfunction, urethral discharge  MUSCULOSKELETAL: NEGATIVE for significant arthralgias or myalgia  NEURO: NEGATIVE for weakness, dizziness or paresthesias  PSYCHIATRIC: NEGATIVE for changes in mood or affect    This document serves as a record of the services and decisions personally performed and made by Toña Arias PA-C. It was created on her behalf by Skip Loya, a trained medical scribe. The creation of this document is based on the provider's statements to the medical scribe.  Skip Loya 9:14 AM May 30, 2018    OBJECTIVE:   /78  Pulse 83  Temp 97.1  F (36.2  C) (Tympanic)  Ht 6' 1\" (1.854 m)  Wt 211 lb 8 oz (95.9 kg)  SpO2 97%  BMI 27.9 kg/m2  EXAM:  GENERAL: healthy, alert and no distress  EYES: Eyes grossly normal to inspection, PERRL and conjunctivae and sclerae normal  HENT: ear canals and TM's normal, nose and mouth without ulcers or lesions  NECK: no adenopathy, no asymmetry, masses, or scars and thyroid normal to " palpation  RESP: lungs clear to auscultation - no rales, rhonchi or wheezes  CV: regular rate and rhythm, normal S1 S2, no S3 or S4, no murmur, click or rub, no peripheral edema and peripheral pulses strong  ABDOMEN: soft, nontender, no hepatosplenomegaly, no masses and bowel sounds normal  MS: no gross musculoskeletal defects noted, no edema  SKIN: no suspicious lesions or rashes  NEURO: Normal strength and tone, mentation intact and speech normal  PSYCH: mentation appears normal, affect normal/bright    Results for orders placed or performed in visit on 05/30/18 (from the past 24 hour(s))   CBC with platelets   Result Value Ref Range    WBC 4.9 4.0 - 11.0 10e9/L    RBC Count 5.91 (H) 4.4 - 5.9 10e12/L    Hemoglobin 17.3 13.3 - 17.7 g/dL    Hematocrit 50.1 40.0 - 53.0 %    MCV 85 78 - 100 fl    MCH 29.3 26.5 - 33.0 pg    MCHC 34.5 31.5 - 36.5 g/dL    RDW 14.3 10.0 - 15.0 %    Platelet Count 158 150 - 450 10e9/L       ASSESSMENT/PLAN:   Terence was seen today for physical.    Diagnoses and all orders for this visit:    Routine general medical examination at a health care facility, Screening for HIV (human immunodeficiency virus), Lipid screening, Screening for diabetes mellitus, Special screening for malignant neoplasm of prostate, Screening for deficiency anemia  Routine health screening.  -     HIV Screening  -     Lipid panel reflex to direct LDL Fasting  -     Comprehensive metabolic panel  -     PSA, screen  -     CBC with platelets    ADD, predominantly inattentive type- ok to be seen every 6 months- [facility] - 12/19/16  Will check lab work to determine if underlying organic etiology of inattention.  Would like to avoid restarting stimulants due to BP elevation.  -     Vitamin B12  -     Vitamin B6  -     Ferritin  -     Vitamin D Deficiency  -     TSH with free T4 reflex    Return in about 1 year (around 5/30/2019) for Physical Exam, Lab Work.    COUNSELING:  Reviewed preventive health counseling, as reflected in  "patient instructions       Regular exercise       Healthy diet/nutrition       HIV screeninx in teen years, 1x in adult years, and at intervals if high risk     reports that he has never smoked. He has never used smokeless tobacco.    Estimated body mass index is 27.9 kg/(m^2) as calculated from the following:    Height as of this encounter: 6' 1\" (1.854 m).    Weight as of this encounter: 211 lb 8 oz (95.9 kg).     Weight management plan: Discussed healthy diet and exercise guidelines and patient will follow up in 12 months in clinic to re-evaluate.    Counseling Resources:  ATP IV Guidelines  Pooled Cohorts Equation Calculator  FRAX Risk Assessment  ICSI Preventive Guidelines  Dietary Guidelines for Americans,   USDA's MyPlate  ASA Prophylaxis  Lung CA Screening    The information in this document, created by the medical scribe for me, accurately reflects the services I personally performed and the decisions made by me. I have reviewed and approved this document for accuracy prior to leaving the patient care area.  May 30, 2018 9:14 AM    Toña Arias PA-C  [facility]  [place]  "

## 2018-06-01 LAB — VIT B6 SERPL-MCNC: 75.4 NMOL/L (ref 20–125)

## 2018-06-04 NOTE — PROGRESS NOTES
Triage: please call pt: his RBC's are large which could be indicative of sleep apnea especially in light of his fatigue.  If interested and especially if he snores, I would recommend we order a sleep referral.    Tejas  I have reviewed your recent labs. Here are the results:    -Liver and gallbladder tests are normal. (ALT,AST, Alk phos, bilirubin), kidney function is normal (Cr, GFR), Sodium is normal, Potassium is normal, Calcium is normal, Glucose is normal (diabetes screening test).   -LDL(bad) cholesterol level is elevated, HDL(good) cholesterol level is low and your triglycerides are elevated which can increase your heart disease risk.  A diet high in fat and simple carbohydrates, genetics and being overweight can contribute to this. ADVISE: Exercise, a low fat, low carbohydrate diet, weight control, and omega-3 fatty acids (fish oil) 4433-2382 mg daily are helpful to improve this.  Rechecking your fasting cholesterol panel in 12 months is recommended (Lipid w/ LDL reflex, DX: hyperlipidemia)  -PSA (prostate specific antigen) test is normal.  This indicates a low likelihood of prostate cancer.  ADVISE: yearly recheck.   -TSH (thyroid stimulating hormone) level is normal which indicates normal thyroid function.  -Normal red blood cell (hgb) levels - however they are large which could be indicative of sleep apnea - if you snore and have not had a sleep study I would recommend that we pursue one, normal white blood cell count and normal platelet levels.  -Vitamin D level is low-normal and oral supplementation should be started.  ADVISE: starting over the counter Vitamin D3  2000 IU - 3 tabs (6000 IU) daily for 6 weeks and then 2000 IU daily to maintain levels.    -Ferritin (iron) level is normal.  -B6 levels and B12 are normal.  For additional lab test information, labtestsonline.org is an excellent reference.     If you have any questions please do not hesitate to contact our office via phone ([phone]) or  MyChart.    Toña Arias, MS, PA-C  [facility]

## 2018-11-01 ENCOUNTER — TRANSFERRED RECORDS (OUTPATIENT)
Dept: HEALTH INFORMATION MANAGEMENT | Facility: CLINIC | Age: 43
End: 2018-11-01

## 2019-05-21 ENCOUNTER — OFFICE VISIT (OUTPATIENT)
Dept: FAMILY MEDICINE | Facility: CLINIC | Age: 44
End: 2019-05-21
Payer: COMMERCIAL

## 2019-05-21 VITALS
TEMPERATURE: 98.4 F | SYSTOLIC BLOOD PRESSURE: 122 MMHG | DIASTOLIC BLOOD PRESSURE: 86 MMHG | OXYGEN SATURATION: 96 % | WEIGHT: 207 LBS | BODY MASS INDEX: 27.43 KG/M2 | HEIGHT: 73 IN | HEART RATE: 90 BPM

## 2019-05-21 DIAGNOSIS — L98.9 SKIN LESION: Primary | ICD-10-CM

## 2019-05-21 PROCEDURE — 99213 OFFICE O/P EST LOW 20 MIN: CPT | Performed by: PHYSICIAN ASSISTANT

## 2019-05-21 ASSESSMENT — MIFFLIN-ST. JEOR: SCORE: 1892.83

## 2019-05-21 NOTE — PROGRESS NOTES
"  SUBJECTIVE:                                                    Terence Brewster is a 44 year old male who presents to clinic today for the following health issues:      Lesion/mole     Onset: since he can remember    Description:   Location: on penis  Character: Round, raised-started 1 year ago        Color: darker tan/darker than skin         Border description: irregular border, raised    History:    Has it spread/changed:  YES- used to be flat  Any previous history of skin cancer: no  Any family history of melanoma: no    Accompanying Signs & Symptoms:   Fever: no  Bleeding: no  Scaling: no  Excessive sun exposure/tanning: n/a  Sunscreen used: n/a  Contact with known allergens:  n/a    Therapies tried and outcome:  nothing with n/a relief    Has cut it shaving - did bleed.      Problem list and histories reviewed & adjusted, as indicated.  Additional history: as documented      ROS:  Constitutional, HEENT, cardiovascular, pulmonary, GI, , musculoskeletal, neuro, skin, endocrine and psych systems are negative, except as otherwise noted.    OBJECTIVE:                                                    /86 (BP Location: Right arm, Patient Position: Chair, Cuff Size: Adult Large)   Pulse 90   Temp 98.4  F (36.9  C) (Oral)   Ht 1.854 m (6' 1\")   Wt 93.9 kg (207 lb)   SpO2 96%   BMI 27.31 kg/m    Body mass index is 27.31 kg/m .  GENERAL: healthy, alert and no distress  EYES: Eyes grossly normal to inspection, PERRL and conjunctivae and sclerae normal   (male): normal male genitalia without urethral discharge, Lesion noted on right side base of penis.  Lesion is about skin color with a cauliflower like appearance.    MS: no gross musculoskeletal defects noted, no edema  SKIN: Skin lesion seen on right side base of penis as described in the  section.    NEURO: Normal strength and tone, mentation intact and speech normal  PSYCH: mentation appears normal, affect normal/bright    Diagnostic Test " Results:  none      ASSESSMENT/PLAN:                                                      Terence was seen today for lesion.    Diagnoses and all orders for this visit:    Skin lesion  -     DERMATOLOGY REFERRAL       Appearance of lesion is more consistent with a wart, but history is not fully consistent with a wart, therefore further evaluation advised with Skin Care.  Consideration of biopsy.  Patient advised this could be an atypical mole vs wart like lesion.      Followup: Return in about 1 week (around 5/28/2019) for Specialty followup, please be seen sooner if needed.     -- I have discussed the patient's diagnosis, and my plan of treatment with the patient and/or family. Patient is aware to followup if symptoms do not improve.  Patient has been advised to be seen sooner or seek more immediate care if symptoms change or worsen.  Patient agrees with and understands the plan today.     See Patient Instructions        Vicki Munoz PA-C    [facility] [place]

## 2019-06-10 ENCOUNTER — OFFICE VISIT (OUTPATIENT)
Dept: FAMILY MEDICINE | Facility: CLINIC | Age: 44
End: 2019-06-10
Payer: COMMERCIAL

## 2019-06-10 VITALS — SYSTOLIC BLOOD PRESSURE: 122 MMHG | DIASTOLIC BLOOD PRESSURE: 76 MMHG

## 2019-06-10 DIAGNOSIS — D22.5: Primary | ICD-10-CM

## 2019-06-10 PROCEDURE — 99213 OFFICE O/P EST LOW 20 MIN: CPT | Performed by: FAMILY MEDICINE

## 2019-06-10 NOTE — LETTER
6/10/2019         RE: Terence Brewster  [address] [place] [address]        Dear Colleague,    Thank you [facility]ster[facility]. Please see a copy of my visit note below.    [facility] - PRIMARY CARE SKIN    CC: Lesion(s)  SUBJECTIVE:   Terence Brewster is a(n) 44 year old male who presents to clinic today with his wife because of a dark lesion on the penis.    Issue One: A mole on the base of the penis.  Onset: Dec 2015.  Enlarging: YES.  Bleeding: NO.  Itchy or irritating: NO.  Pain or tenderness: NO.  Changing color: NO.    Personal Medical History  Skin cancer: NO  Eczema Psoriasis Autoimmune   NO NO NO     Family Medical History  Skin cancer: NO  Eczema Psoriasis Autoimmune   NO NO NO     Sun Exposure History  Previous history of significant sun exposure:  Blistering sunburns: NO.  Tanning beds: NO.  Sunscreen usage: YES, SPF: 30.  UV-protective clothing usage: NO.  Wide-brimmed hat usage: NO.  UV-protective sunglasses usage: YES.  Mid-day sun avoidance: NO.    Occupation: owns groups homes for disabled (indoor).    Refer to electronic medical record (EMR) for past medical history and medications.    INTEGUMENTARY/SKIN: POSITIVE for mole changes  ROS: 14 point review of systems was negative except the symptoms listed above in the HPI.    This document serves as a record of the services and decisions personally performed and made by Karie Myers MD and was created by Ag Huerta, a trained medical scribe, based on personal observations and provider statements to the medical scribe.  Whitley 10, 2019 7:53 AM   Ag Huerta    OBJECTIVE:   GENERAL: healthy, alert and no distress.  SKIN: Gonzalez Skin Type - III.  Groin examined. The dermatoscope was used to help evaluate pigmented lesions.  Skin Pertinent Findings:  Base of penis, right side: 4 mm in size flesh-colored raised smooth lesion most consistent with benign  nevus.    ASSESSMENT:     Encounter Diagnosis   Name Primary?     Compound nevus of groin Yes         PLAN:   Patient Instructions   FUTURE APPOINTMENTS  Follow up at your convenience if you choose to proceed with cryotherapy or shave excision of the mole.    TT: 20 minutes.  CT: 15 minutes.    The information in this document, created by the medical scribe for me, accurately reflects the services I personally performed and the decisions made by me. I have reviewed and approved this document for accuracy prior to leaving the patient care area.  Whitley 10, 2019 7:53 AM  Karie Myers MD  [facility] [place] [facility]    Again, thank you for allowing me to participate in the care of your patient.        Sincerely,        Karie Myers MD

## 2019-06-10 NOTE — PROGRESS NOTES
[facility] - PRIMARY CARE SKIN    CC: Lesion(s)  SUBJECTIVE:   Terence Brewster is a(n) 44 year old male who presents to clinic today with his wife because of a dark lesion on the penis.    Issue One: A mole on the base of the penis.  Onset: Dec 2015.  Enlarging: YES.  Bleeding: NO.  Itchy or irritating: NO.  Pain or tenderness: NO.  Changing color: NO.    Personal Medical History  Skin cancer: NO  Eczema Psoriasis Autoimmune   NO NO NO     Family Medical History  Skin cancer: NO  Eczema Psoriasis Autoimmune   NO NO NO     Sun Exposure History  Previous history of significant sun exposure:  Blistering sunburns: NO.  Tanning beds: NO.  Sunscreen usage: YES, SPF: 30.  UV-protective clothing usage: NO.  Wide-brimmed hat usage: NO.  UV-protective sunglasses usage: YES.  Mid-day sun avoidance: NO.    Occupation: owns groups homes for disabled (indoor).    Refer to electronic medical record (EMR) for past medical history and medications.    INTEGUMENTARY/SKIN: POSITIVE for mole changes  ROS: 14 point review of systems was negative except the symptoms listed above in the HPI.    This document serves as a record of the services and decisions personally performed and made by Karie Myers MD and was created by Ag Huerta, a trained medical scribe, based on personal observations and provider statements to the medical scribe.  Whitley 10, 2019 7:53 AM   Ag Huerta    OBJECTIVE:   GENERAL: healthy, alert and no distress.  SKIN: Gonzalez Skin Type - III.  Groin examined. The dermatoscope was used to help evaluate pigmented lesions.  Skin Pertinent Findings:  Base of penis, right side: 4 mm in size flesh-colored raised smooth lesion most consistent with benign nevus.    ASSESSMENT:     Encounter Diagnosis   Name Primary?     Compound nevus of groin Yes         PLAN:   Patient Instructions   FUTURE APPOINTMENTS  Follow up at your convenience if you choose to proceed with cryotherapy or shave excision of the mole.    TT:  20 minutes.  CT: 15 minutes.    The information in this document, created by the medical scribe for me, accurately reflects the services I personally performed and the decisions made by me. I have reviewed and approved this document for accuracy prior to leaving the patient care area.  Whitley 10, 2019 7:53 AM  Karie Myers MD  [facility]

## 2019-06-10 NOTE — PATIENT INSTRUCTIONS
FUTURE APPOINTMENTS  Follow up at your convenience if you choose to proceed with cryotherapy or shave excision of the mole.

## 2019-11-05 ENCOUNTER — HEALTH MAINTENANCE LETTER (OUTPATIENT)
Age: 44
End: 2019-11-05

## 2020-10-14 ENCOUNTER — OFFICE VISIT (OUTPATIENT)
Dept: FAMILY MEDICINE | Facility: CLINIC | Age: 45
End: 2020-10-14
Payer: COMMERCIAL

## 2020-10-14 ENCOUNTER — NURSE TRIAGE (OUTPATIENT)
Dept: FAMILY MEDICINE | Facility: CLINIC | Age: 45
End: 2020-10-14

## 2020-10-14 ENCOUNTER — NURSE TRIAGE (OUTPATIENT)
Dept: NURSING | Facility: CLINIC | Age: 45
End: 2020-10-14

## 2020-10-14 VITALS
SYSTOLIC BLOOD PRESSURE: 120 MMHG | WEIGHT: 192 LBS | BODY MASS INDEX: 25.45 KG/M2 | HEIGHT: 73 IN | HEART RATE: 82 BPM | TEMPERATURE: 97.2 F | OXYGEN SATURATION: 100 % | DIASTOLIC BLOOD PRESSURE: 78 MMHG

## 2020-10-14 DIAGNOSIS — F41.9 ANXIETY: ICD-10-CM

## 2020-10-14 DIAGNOSIS — F90.0 ADHD, PREDOMINANTLY INATTENTIVE TYPE: ICD-10-CM

## 2020-10-14 DIAGNOSIS — K62.5 RECTAL BLEEDING: Primary | ICD-10-CM

## 2020-10-14 DIAGNOSIS — Z23 NEED FOR PROPHYLACTIC VACCINATION AND INOCULATION AGAINST INFLUENZA: ICD-10-CM

## 2020-10-14 PROCEDURE — 90471 IMMUNIZATION ADMIN: CPT | Performed by: INTERNAL MEDICINE

## 2020-10-14 PROCEDURE — 99214 OFFICE O/P EST MOD 30 MIN: CPT | Mod: 25 | Performed by: INTERNAL MEDICINE

## 2020-10-14 PROCEDURE — 90686 IIV4 VACC NO PRSV 0.5 ML IM: CPT | Performed by: INTERNAL MEDICINE

## 2020-10-14 ASSESSMENT — MIFFLIN-ST. JEOR: SCORE: 1819.79

## 2020-10-14 NOTE — TELEPHONE ENCOUNTER
Attempt #1  Called patient @ [phone] - Left a non-detailed message to call back and speak with any triage nurse.    Elisha Mosher RN  [facility]

## 2020-10-14 NOTE — PROGRESS NOTES
Subjective     Terence Brewster is a 45 year old male who presents to clinic today for the following health issues:    HPI         Rectal problem   Onset/Duration: x 1 day   Description:   Amy-anal lump: no  Pain: no  Itching: no  Accompanying Signs & Symptoms:  Blood in stool: YES  Changes in stool pattern: no  History:   Any previous GI studies done:none  Family History of colon cancer: no  Precipitating factors:   None  Alleviating factors:  None  Therapies tried and outcome: none    Tejas is here with an episode of bloody stool this morning.  There was some blood in the stool and a small amount at the bottom of the toilet. He tried to have another bowel movement shortly after the first and there was also a small amount of blood in that stool.  He has never had this before.  There is no rectal pain, bumps, or itching. He has no other GI symptoms - no abdominal pain, nausea, vomiting, decreased appetite, etc.  He does note he can be constipated at times or need to strain during bowel movements.     Toward the end of the visit he also asked about medications for anxiety.  He feels anxious for the most part all day long. He feels like this might be worse since he stopped adderall. The dose of adderall he was on was giving him other side effects.  He then wondered if starting a low dose of adderall might help his anxiety, since the adderall helps him feel in control of what he is doing and that in turn helps his anxiety.  He works overseeing several mental health living facilities, so is getting barraged with problems from the facility all day long which is very stressful.  He has been getting through his work okay without the adderall.  He has never taken anything for anxiety before. His daughter is on a number of medications for mental health diagnoses, but he isn't sure exactly what she is taking.         Review of Systems   Const, GI, psych reviewed,  otherwise negative unless noted above.        Objective    BP  "120/78   Pulse 82   Temp 97.2  F (36.2  C) (Tympanic)   Ht 1.854 m (6' 1\")   Wt 87.1 kg (192 lb)   SpO2 100%   BMI 25.33 kg/m    Body mass index is 25.33 kg/m .  Physical Exam   GENERAL: healthy, alert and no distress  ABDOMEN: soft, nontender, no hepatosplenomegaly, no masses and bowel sounds normal  RECTAL (male): no obvious hemorrhoids or fissures or signs of bleeding   PSYCH: mentation appears normal, affect normal/bright            Assessment & Plan     Rectal bleeding  One time occurrence without accompanying GI symptoms. May be due to internal hemorrhoid. Recommended miralax to avoid any straining or constipation.  With one episode can monitor.  If this happens frequently and/or he develops other symptoms would consider colonoscopy     Anxiety  I am meeting Tejas for the first time today, he usually follows with Dr. Gomez but he's had a hard time getting in to see her for awhile.  But from what I am hearing, it sounds like most of his issue is underlying anxiety, but certainly ADHD may be playing a role as well.  We discussed psychology eval as the most accurate way to figure out what is contributing, but also consider low dose adderall and/or ssri to see how he feels.  He is going to think about these options and send me a [employer] message.     ADD, predominantly inattentive type- ok to be seen every 6 months- [facility] - 12/19/16  As above     Need for prophylactic vaccination and inoculation against influenza  - INFLUENZA VACCINE IM > 6 MONTHS VALENT IIV4 [48394]         Return in about 2 weeks (around 10/28/2020) for Mental health as needed .    Yolette Lacy MD  [facility]    "

## 2020-10-14 NOTE — TELEPHONE ENCOUNTER
Patient has appt made for 10/20.   Please triage and advise if can wait till then to be seen       Salina Pizarro

## 2020-10-14 NOTE — TELEPHONE ENCOUNTER
Tejas went to the bathroom this morning and noticed bright red blood in stool.  First time of happening.  Denies clots and dizziness.  Denies fever cough and shortness of breath.      COVID 19 Nurse Triage Plan/Patient Instructions    Please be aware that novel coronavirus (COVID-19) may be circulating in the community. If you develop symptoms such as fever, cough, or SOB or if you have concerns about the presence of another infection including coronavirus (COVID-19), please contact your health care provider or visit [email or web address].     Disposition/Instructions    In-Person Visit with provider recommended. Reference Visit Selection Guide.    Thank you for taking steps to prevent the spread of this virus.  o Limit your contact with others.  o Wear a simple mask to cover your cough.  o Wash your hands well and often.    Resources    [facility]: About COVID-19: [email or web address]    CDC: What to Do If You're Sick: www.cdc.gov/coronavirus/2019-ncov/about/steps-when-sick.html    CDC: Ending Home Isolation: www.cdc.gov/coronavirus/2019-ncov/hcp/disposition-in-home-patients.html     CDC: Caring for Someone: www.cdc.gov/coronavirus/2019-ncov/if-you-are-sick/care-for-someone.html     [facility]: Interim Guidance for Hospital Discharge to Home: www.health.[facility].mn.us/diseases/coronavirus/hcp/hospdischarge.pdf    [facility] clinical trials (COVID-19 research studies): [email or web address]     Below are the COVID-19 hotlines at the [facility] ([facility]). Interpreters are available.   o For health questions: Call [phone] or 1-605.816.9549 (7 a.m. to 7 p.m.)  o For questions about schools and childcare: Call 154-160-6210 or 1-768.160.4996 (7 a.m. to 7 p.m.)                       Additional Information    Negative: Difficult to awaken or acting confused (e.g., disoriented, slurred speech)    Negative: Shock suspected (e.g., cold/pale/clammy skin, too weak to  "stand, low BP, rapid pulse)    Negative: Passed out (i.e., lost consciousness, collapsed and was not responding)    Negative: [1] Vomiting AND [2] contains red blood or black (\"coffee ground\") material  (Exception: few red streaks in vomit that only happened once)    Negative: Sounds like a life-threatening emergency to the triager    Negative: SEVERE rectal bleeding (large blood clots; on and off, or constant bleeding)    Negative: SEVERE dizziness (e.g., unable to stand, requires support to walk, feels like passing out now)    Negative: [1] MODERATE rectal bleeding (small blood clots, passing blood without stool, or toilet water turns red) AND [2] more than once a day    Negative: Pale skin (pallor) of new onset or worsening    Negative: Tarry or jet black-colored stool (not dark green)    Negative: [1] Constant abdominal pain AND [2] present > 2 hours    Negative: Rectal foreign body (i.e., now or within past week;  inserted or swallowed)    Negative: High-risk adult (e.g., prior surgery on aorta, abdominal aortic aneurysm)    Negative: Taking Coumadin (warfarin) or other strong blood thinner, or known bleeding disorder (e.g., thrombocytopenia)    Negative: Known cirrhosis of the liver (or history of liver failure or ascites)    Negative: [1] Colonoscopy AND [2] in past 72 hours    Negative: Patient sounds very sick or weak to the triager    Negative: MODERATE rectal bleeding (small blood clots, passing blood without stool, or toilet water turns red)    MILD rectal bleeding (more than just a few drops or streaks)    Protocols used: RECTAL BLEEDING-A-AH      "

## 2020-10-15 NOTE — TELEPHONE ENCOUNTER
Called patient @ # below -     Patient noted that he saw Dr. Lacy yesterday. Nothing further needed      Elisha Mosher RN  [facility]

## 2020-10-25 ENCOUNTER — MYC MEDICAL ADVICE (OUTPATIENT)
Dept: FAMILY MEDICINE | Facility: CLINIC | Age: 45
End: 2020-10-25

## 2020-10-25 DIAGNOSIS — F90.0 ADHD, PREDOMINANTLY INATTENTIVE TYPE: ICD-10-CM

## 2020-10-25 DIAGNOSIS — F41.9 ANXIETY: Primary | ICD-10-CM

## 2020-10-26 RX ORDER — BUPROPION HYDROCHLORIDE 150 MG/1
150 TABLET ORAL EVERY MORNING
Qty: 60 TABLET | Refills: 1 | Status: SHIPPED | OUTPATIENT
Start: 2020-10-26 | End: 2020-12-16

## 2020-10-26 NOTE — TELEPHONE ENCOUNTER
Please see [employer] message and advise.      Thank you,  Salina NOONANRN BSN  [facility]  [phone]

## 2020-11-22 ENCOUNTER — HEALTH MAINTENANCE LETTER (OUTPATIENT)
Age: 45
End: 2020-11-22

## 2020-12-16 ENCOUNTER — MYC REFILL (OUTPATIENT)
Dept: FAMILY MEDICINE | Facility: CLINIC | Age: 45
End: 2020-12-16

## 2020-12-16 DIAGNOSIS — F90.0 ADHD, PREDOMINANTLY INATTENTIVE TYPE: ICD-10-CM

## 2020-12-16 DIAGNOSIS — F41.9 ANXIETY: ICD-10-CM

## 2020-12-16 RX ORDER — BUPROPION HYDROCHLORIDE 150 MG/1
150 TABLET ORAL EVERY MORNING
Qty: 30 TABLET | Refills: 0 | Status: SHIPPED | OUTPATIENT
Start: 2020-12-16 | End: 2021-01-18

## 2021-01-18 ENCOUNTER — OFFICE VISIT (OUTPATIENT)
Dept: FAMILY MEDICINE | Facility: CLINIC | Age: 46
End: 2021-01-18
Payer: COMMERCIAL

## 2021-01-18 VITALS
OXYGEN SATURATION: 98 % | HEART RATE: 74 BPM | DIASTOLIC BLOOD PRESSURE: 80 MMHG | WEIGHT: 194 LBS | HEIGHT: 73 IN | SYSTOLIC BLOOD PRESSURE: 110 MMHG | TEMPERATURE: 97.1 F | BODY MASS INDEX: 25.71 KG/M2

## 2021-01-18 DIAGNOSIS — F90.0 ADHD, PREDOMINANTLY INATTENTIVE TYPE: ICD-10-CM

## 2021-01-18 DIAGNOSIS — F41.9 ANXIETY: ICD-10-CM

## 2021-01-18 DIAGNOSIS — Z00.00 ROUTINE GENERAL MEDICAL EXAMINATION AT A HEALTH CARE FACILITY: Primary | ICD-10-CM

## 2021-01-18 DIAGNOSIS — Z13.220 SCREENING FOR HYPERLIPIDEMIA: ICD-10-CM

## 2021-01-18 DIAGNOSIS — Z13.1 SCREENING FOR DIABETES MELLITUS: ICD-10-CM

## 2021-01-18 PROCEDURE — 82947 ASSAY GLUCOSE BLOOD QUANT: CPT | Performed by: INTERNAL MEDICINE

## 2021-01-18 PROCEDURE — 99396 PREV VISIT EST AGE 40-64: CPT | Performed by: INTERNAL MEDICINE

## 2021-01-18 PROCEDURE — 36415 COLL VENOUS BLD VENIPUNCTURE: CPT | Performed by: INTERNAL MEDICINE

## 2021-01-18 PROCEDURE — 80061 LIPID PANEL: CPT | Performed by: INTERNAL MEDICINE

## 2021-01-18 RX ORDER — BUPROPION HYDROCHLORIDE 150 MG/1
150 TABLET ORAL EVERY MORNING
Qty: 90 TABLET | Refills: 3 | Status: SHIPPED | OUTPATIENT
Start: 2021-01-18 | End: 2022-03-03

## 2021-01-18 ASSESSMENT — MIFFLIN-ST. JEOR: SCORE: 1828.86

## 2021-01-18 NOTE — PROGRESS NOTES
3  SUBJECTIVE:   CC: Terence Brewster is an 45 year old male who presents for preventive health visit.     Tjeas lives with his wife and kids.  He works overseeing several mental health living facilities.  He and his family spent a couple months in [place] over Nov and Dec in their motor home since the kids were doing distance learning.     We started wellbutrin a few months ago for anxiety and history of ADHD.   He is taking 150mg daily and this seems to be working well.  He would like to stay with the current dose.       Patient has been advised of split billing requirements and indicates understanding: Yes     Healthy Habits:    Do you get at least three servings of calcium containing foods daily (dairy, green leafy vegetables, etc.)? Yes. Cut out carbs recently     Amount of exercise or daily activities, outside of work: 1-2 day(s) per week    Problems taking medications regularly No    Medication side effects: No    Have you had an eye exam in the past two years? no    Do you see a dentist twice per year? yes    Do you have sleep apnea, excessive snoring or daytime drowsiness?no        Today's PHQ-2 Score:   PHQ-2 ( 1999 Pfizer) 1/18/2021 5/30/2018   Q1: Little interest or pleasure in doing things 0 0   Q2: Feeling down, depressed or hopeless 0 0   PHQ-2 Score 0 0       Abuse: Current or Past(Physical, Sexual or Emotional)- No  Do you feel safe in your environment? Yes        Social History     Tobacco Use     Smoking status: Never Smoker     Smokeless tobacco: Never Used   Substance Use Topics     Alcohol use: Yes     Alcohol/week: 10.0 standard drinks     Types: 10 Standard drinks or equivalent per week     Comment: 1 per day      If you drink alcohol do you typically have >3 drinks per day or >7 drinks per week? No                      Last PSA:   PSA   Date Value Ref Range Status   05/30/2018 0.64 0 - 4 ug/L Final     Comment:     Assay Method:  Chemiluminescence using Siemens Vista analyzer       Reviewed  orders with patient. 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 - had as long as can remember      Grief reaction- to wife's death 11/9/2013 from cholangiocarcinoma- ongoing     Adjustment disorder with mixed anxiety and depressed mood     ED (erectile dysfunction)     Past Surgical History:   Procedure Laterality Date     HERNIA REPAIR, INGUINAL RT/LT Left 1978    left inguinal herniorrhaphy at 3 year of age     VASECTOMY  2005     vasectomy  2016    reversal       Social History     Tobacco Use     Smoking status: Never Smoker     Smokeless tobacco: Never Used   Substance Use Topics     Alcohol use: Yes     Alcohol/week: 10.0 standard drinks     Types: 10 Standard drinks or equivalent per week     Comment: 1 per day      Family History   Problem Relation Age of Onset     Lipids Father      Coronary Artery Disease Father         heart attack     Cancer Paternal Grandfather      Colon Cancer No family hx of      Prostate Cancer No family hx of          Current Outpatient Medications   Medication Sig Dispense Refill     buPROPion (WELLBUTRIN XL) 150 MG 24 hr tablet Take 1 tablet (150 mg) by mouth every morning 90 tablet 3       Reviewed and updated as needed this visit by clinical staff  Tobacco  Allergies  Meds   Med Hx  Surg Hx  Fam Hx  Soc Hx        Reviewed and updated as needed this visit by Provider  Tobacco   Meds   Med Hx  Surg Hx  Fam Hx  Soc Hx           ROS:  CONSTITUTIONAL: NEGATIVE for fever, chills, change in weight  INTEGUMENTARY/SKIN: NEGATIVE for worrisome rashes, moles or lesions  EYES: NEGATIVE for vision changes or irritation  ENT: NEGATIVE for ear, mouth and throat problems  RESP: NEGATIVE for significant cough or SOB  CV: NEGATIVE for chest pain,  "palpitations or peripheral edema  GI: NEGATIVE for nausea, abdominal pain, heartburn, or change in bowel habits   male: negative for dysuria, hematuria, decreased urinary stream, erectile dysfunction, urethral discharge  MUSCULOSKELETAL: NEGATIVE for significant arthralgias or myalgia  NEURO: NEGATIVE for weakness, dizziness or paresthesias  PSYCHIATRIC: NEGATIVE for changes in mood or affect    OBJECTIVE:   /80   Pulse 74   Temp 97.1  F (36.2  C) (Tympanic)   Ht 1.854 m (6' 1\")   Wt 88 kg (194 lb)   SpO2 98%   BMI 25.60 kg/m    EXAM:  GENERAL: healthy, alert and no distress  EYES: Eyes grossly normal to inspection, PERRL and conjunctivae and sclerae normal  HENT: ear canals and TM's normal, mouth without ulcers or lesions  NECK: no adenopathy, no asymmetry, masses, or scars and thyroid normal to palpation  RESP: lungs clear to auscultation - no rales, rhonchi or wheezes  CV: regular rate and rhythm, normal S1 S2, no S3 or S4, no murmur, click or rub, no peripheral edema and peripheral pulses strong  ABDOMEN: soft, nontender, no hepatosplenomegaly, no masses and bowel sounds normal  MS: no gross musculoskeletal defects noted, no edema  NEURO: Normal strength and tone, mentation intact and speech normal  PSYCH: mentation appears normal, affect normal/bright        ASSESSMENT/PLAN:   1. Routine general medical examination at a health care facility  Healthy 45 year old   Labs today   imms UTD     2. Anxiety  Doing well on current dose of wellbutrin  - buPROPion (WELLBUTRIN XL) 150 MG 24 hr tablet; Take 1 tablet (150 mg) by mouth every morning  Dispense: 90 tablet; Refill: 3    3. ADD, predominantly inattentive type- ok to be seen every 6 months- [facility] - 12/19/16  - buPROPion (WELLBUTRIN XL) 150 MG 24 hr tablet; Take 1 tablet (150 mg) by mouth every morning  Dispense: 90 tablet; Refill: 3    4. Screening for diabetes mellitus  - Lipid panel reflex to direct LDL Fasting    5. Screening for hyperlipidemia  - " "Glucose      COUNSELING:  Reviewed preventive health counseling, as reflected in patient instructions       Regular exercise       Healthy diet/nutrition       Prostate cancer screening    Estimated body mass index is 25.6 kg/m  as calculated from the following:    Height as of this encounter: 1.854 m (6' 1\").    Weight as of this encounter: 88 kg (194 lb).        He reports that he has never smoked. He has never used smokeless tobacco.      Counseling Resources:  ATP IV Guidelines  Pooled Cohorts Equation Calculator  FRAX Risk Assessment  ICSI Preventive Guidelines  Dietary Guidelines for Americans, 2010  USDA's MyPlate  ASA Prophylaxis  Lung CA Screening    Yolette Lacy MD  [facility]  "

## 2021-01-19 LAB
CHOLEST SERPL-MCNC: 179 MG/DL
GLUCOSE SERPL-MCNC: 93 MG/DL (ref 70–99)
HDLC SERPL-MCNC: 31 MG/DL
LDLC SERPL CALC-MCNC: 117 MG/DL
NONHDLC SERPL-MCNC: 148 MG/DL
TRIGL SERPL-MCNC: 155 MG/DL

## 2021-01-26 ENCOUNTER — MYC MEDICAL ADVICE (OUTPATIENT)
Dept: FAMILY MEDICINE | Facility: CLINIC | Age: 46
End: 2021-01-26

## 2021-01-26 NOTE — TELEPHONE ENCOUNTER
Please see My Chart Message and advise. Labs reviewed and did not see that the blood type has been tested for this patient.   Triage to contact the patient.  Carolyn Car RN

## 2021-08-18 ENCOUNTER — TRANSFERRED RECORDS (OUTPATIENT)
Dept: HEALTH INFORMATION MANAGEMENT | Facility: CLINIC | Age: 46
End: 2021-08-18

## 2021-08-24 DIAGNOSIS — D22.5: Primary | ICD-10-CM

## 2021-08-25 ENCOUNTER — APPOINTMENT (OUTPATIENT)
Dept: URBAN - METROPOLITAN AREA CLINIC 257 | Age: 46
Setting detail: DERMATOLOGY
End: 2021-08-25

## 2021-08-25 DIAGNOSIS — D485 NEOPLASM OF UNCERTAIN BEHAVIOR OF SKIN: ICD-10-CM

## 2021-08-25 PROBLEM — D40.8 NEOPLASM OF UNCERTAIN BEHAVIOR OF OTHER SPECIFIED MALE GENITAL ORGANS: Status: ACTIVE | Noted: 2021-08-25

## 2021-08-25 PROCEDURE — OTHER COUNSELING: OTHER

## 2021-08-25 PROCEDURE — OTHER PATHOLOGY BILLING: OTHER

## 2021-08-25 PROCEDURE — 11306 SHAVE SKIN LESION 0.6-1.0 CM: CPT

## 2021-08-25 PROCEDURE — OTHER MIPS QUALITY: OTHER

## 2021-08-25 PROCEDURE — 88305 TISSUE EXAM BY PATHOLOGIST: CPT

## 2021-08-25 PROCEDURE — OTHER SHAVE REMOVAL: OTHER

## 2021-08-25 ASSESSMENT — LOCATION ZONE DERM: LOCATION ZONE: PENIS

## 2021-08-25 ASSESSMENT — LOCATION DETAILED DESCRIPTION DERM: LOCATION DETAILED: LEFT DORSAL SHAFT OF PENIS

## 2021-08-25 ASSESSMENT — LOCATION SIMPLE DESCRIPTION DERM: LOCATION SIMPLE: PENIS

## 2021-08-25 NOTE — PROCEDURE: PATHOLOGY BILLING
Immunohistochemistry ([id number] and [id number]) billing is not performed here. Please use the Immunohistochemistry Stain Billing plan to accomplish this. Immunohistochemistry ([id number] and [id number]) billing is not performed here. Please use the Immunohistochemistry Stain Billing plan to accomplish this.

## 2021-08-25 NOTE — PROCEDURE: MIPS QUALITY
Quality 226: Preventive Care And Screening: Tobacco Use: Screening And Cessation Intervention: Patient screened for tobacco use and is an ex/non-smoker
Detail Level: Detailed
Quality 110: Preventive Care And Screening: Influenza Immunization: Influenza Immunization Ordered or Recommended, but not Administered due to system reason
Quality 431: Preventive Care And Screening: Unhealthy Alcohol Use - Screening: Patient screened for unhealthy alcohol use using a single question and scores less than 2 times per year
Quality 130: Documentation Of Current Medications In The Medical Record: Current Medications Documented

## 2021-08-25 NOTE — PROCEDURE: SHAVE REMOVAL
Medical Necessity Clause: This procedure was medically necessary because the lesion that was treated was:
Render Path Notes In Note?: No
Body Location Override (Optional - Billing Will Still Be Based On Selected Body Map Location If Applicable): Top shaft of penis
Size Of Lesion In Cm (Required): 0.6
Hemostasis: Drysol
Was A Bandage Applied: Yes
Anesthesia Type: 1% lidocaine with epinephrine
Medical Necessity Information: It is in your best interest to select a reason for this procedure from the list below. All of these items fulfill various CMS LCD requirements except the new and changing color options.
X Size Of Lesion In Cm (Optional): 0
Wound Care: Petrolatum
Consent was obtained from the patient. The risks and benefits to therapy were discussed in detail. Specifically, the risks of infection, scarring, bleeding, prolonged wound healing, incomplete removal, allergy to anesthesia, nerve injury and recurrence were addressed. Prior to the procedure, the treatment site was clearly identified and confirmed by the patient. All components of Universal Protocol/PAUSE Rule completed.
Notification Instructions: Patient will be notified of pathology results. However, patient instructed to call the office if not contacted within 2 weeks.
Biopsy Method: Dermablade
Post-Care Instructions: I reviewed with the patient in detail post-care instructions. Patient is to keep the biopsy site dry overnight, and then apply bacitracin twice daily until healed. Patient may apply hydrogen peroxide soaks to remove any crusting.
Billing Type: Third-Party Bill
Detail Level: Detailed

## 2021-09-19 ENCOUNTER — HEALTH MAINTENANCE LETTER (OUTPATIENT)
Age: 46
End: 2021-09-19

## 2022-03-06 ENCOUNTER — HEALTH MAINTENANCE LETTER (OUTPATIENT)
Age: 47
End: 2022-03-06

## 2022-04-28 ENCOUNTER — OFFICE VISIT (OUTPATIENT)
Dept: FAMILY MEDICINE | Facility: CLINIC | Age: 47
End: 2022-04-28
Payer: COMMERCIAL

## 2022-04-28 VITALS
HEIGHT: 73 IN | DIASTOLIC BLOOD PRESSURE: 74 MMHG | TEMPERATURE: 96.1 F | WEIGHT: 208 LBS | BODY MASS INDEX: 27.57 KG/M2 | HEART RATE: 94 BPM | OXYGEN SATURATION: 97 % | SYSTOLIC BLOOD PRESSURE: 110 MMHG

## 2022-04-28 DIAGNOSIS — Z00.00 ROUTINE GENERAL MEDICAL EXAMINATION AT A HEALTH CARE FACILITY: Primary | ICD-10-CM

## 2022-04-28 DIAGNOSIS — Z13.220 SCREENING FOR LIPID DISORDERS: ICD-10-CM

## 2022-04-28 DIAGNOSIS — Z13.0 SCREENING, DEFICIENCY ANEMIA, IRON: ICD-10-CM

## 2022-04-28 DIAGNOSIS — F90.0 ADHD, PREDOMINANTLY INATTENTIVE TYPE: ICD-10-CM

## 2022-04-28 DIAGNOSIS — F41.1 GENERALIZED ANXIETY DISORDER: ICD-10-CM

## 2022-04-28 DIAGNOSIS — Z13.1 SCREENING FOR DIABETES MELLITUS: ICD-10-CM

## 2022-04-28 DIAGNOSIS — Z12.11 SCREEN FOR COLON CANCER: ICD-10-CM

## 2022-04-28 LAB
CHOLEST SERPL-MCNC: 188 MG/DL
FASTING STATUS PATIENT QL REPORTED: YES
FASTING STATUS PATIENT QL REPORTED: YES
GLUCOSE BLD-MCNC: 103 MG/DL (ref 70–99)
HDLC SERPL-MCNC: 33 MG/DL
HGB BLD-MCNC: 16.8 G/DL (ref 13.3–17.7)
LDLC SERPL CALC-MCNC: 105 MG/DL
NONHDLC SERPL-MCNC: 155 MG/DL
TRIGL SERPL-MCNC: 250 MG/DL

## 2022-04-28 PROCEDURE — 36415 COLL VENOUS BLD VENIPUNCTURE: CPT | Performed by: FAMILY MEDICINE

## 2022-04-28 PROCEDURE — 99396 PREV VISIT EST AGE 40-64: CPT | Performed by: FAMILY MEDICINE

## 2022-04-28 PROCEDURE — 80061 LIPID PANEL: CPT | Performed by: FAMILY MEDICINE

## 2022-04-28 PROCEDURE — 82947 ASSAY GLUCOSE BLOOD QUANT: CPT | Performed by: FAMILY MEDICINE

## 2022-04-28 PROCEDURE — 85018 HEMOGLOBIN: CPT | Performed by: FAMILY MEDICINE

## 2022-04-28 RX ORDER — BUPROPION HYDROCHLORIDE 300 MG/1
300 TABLET ORAL EVERY MORNING
Qty: 90 TABLET | Refills: 3 | Status: SHIPPED | OUTPATIENT
Start: 2022-04-28 | End: 2023-06-02

## 2022-04-28 ASSESSMENT — ENCOUNTER SYMPTOMS
MYALGIAS: 0
FREQUENCY: 0
JOINT SWELLING: 0
SORE THROAT: 0
PARESTHESIAS: 0
HEARTBURN: 1
CHILLS: 0
DIARRHEA: 0
ARTHRALGIAS: 0
CONSTIPATION: 0
FEVER: 0
NAUSEA: 0
HEMATOCHEZIA: 0
PALPITATIONS: 0
SHORTNESS OF BREATH: 0
HEADACHES: 0
NERVOUS/ANXIOUS: 0
DIZZINESS: 0
DYSURIA: 0
EYE PAIN: 0
WEAKNESS: 0
COUGH: 0
ABDOMINAL PAIN: 0
HEMATURIA: 0

## 2022-04-28 ASSESSMENT — ANXIETY QUESTIONNAIRES
GAD7 TOTAL SCORE: 0
2. NOT BEING ABLE TO STOP OR CONTROL WORRYING: NOT AT ALL
6. BECOMING EASILY ANNOYED OR IRRITABLE: NOT AT ALL
7. FEELING AFRAID AS IF SOMETHING AWFUL MIGHT HAPPEN: NOT AT ALL
3. WORRYING TOO MUCH ABOUT DIFFERENT THINGS: NOT AT ALL
1. FEELING NERVOUS, ANXIOUS, OR ON EDGE: NOT AT ALL
7. FEELING AFRAID AS IF SOMETHING AWFUL MIGHT HAPPEN: NOT AT ALL
5. BEING SO RESTLESS THAT IT IS HARD TO SIT STILL: NOT AT ALL
4. TROUBLE RELAXING: NOT AT ALL

## 2022-04-28 ASSESSMENT — PATIENT HEALTH QUESTIONNAIRE - PHQ9
SUM OF ALL RESPONSES TO PHQ QUESTIONS 1-9: 0
10. IF YOU CHECKED OFF ANY PROBLEMS, HOW DIFFICULT HAVE THESE PROBLEMS MADE IT FOR YOU TO DO YOUR WORK, TAKE CARE OF THINGS AT HOME, OR GET ALONG WITH OTHER PEOPLE: NOT DIFFICULT AT ALL
SUM OF ALL RESPONSES TO PHQ QUESTIONS 1-9: 0

## 2022-04-28 NOTE — PROGRESS NOTES
SUBJECTIVE:   CC: Terence Brewster is an 47 year old male who presents for preventative health visit.     Patient has been advised of split billing requirements and indicates understanding: Yes  Healthy Habits:     Getting at least 3 servings of Calcium per day:  Yes    Bi-annual eye exam:  NO    Dental care twice a year:  Yes    Sleep apnea or symptoms of sleep apnea:  None    Diet:  Regular (no restrictions)    Frequency of exercise:  2-3 days/week    Duration of exercise:  Other    Taking medications regularly:  Yes    Medication side effects:  None    PHQ-2 Total Score: 0    Additional concerns today:  No    Depression and Anxiety Follow-Up    How are you doing with your depression since your last visit? No change    How are you doing with your anxiety since your last visit?  No change    Are you having other symptoms that might be associated with depression or anxiety? No    Have you had a significant life event? OTHER: Had another Baby Girl      Do you have any concerns with your use of alcohol or other drugs? No    Social History     Tobacco Use     Smoking status: Never Smoker     Smokeless tobacco: Never Used   Substance Use Topics     Alcohol use: Yes     Alcohol/week: 10.0 standard drinks     Types: 10 Standard drinks or equivalent per week     Comment: 1 per day      Drug use: No     Comment: no herbal meds either      PHQ 4/28/2022   PHQ-9 Total Score 0   Q9: Thoughts of better off dead/self-harm past 2 weeks Not at all     ZURI-7 SCORE 11/20/2014 4/28/2022   Total Score 14 -   Total Score - 0 (minimal anxiety)   Total Score - 0     Last PHQ-9 4/28/2022   1.  Little interest or pleasure in doing things 0   2.  Feeling down, depressed, or hopeless 0   3.  Trouble falling or staying asleep, or sleeping too much 0   4.  Feeling tired or having little energy 0   5.  Poor appetite or overeating 0   6.  Feeling bad about yourself 0   7.  Trouble concentrating 0   8.  Moving slowly or restless 0   Q9: Thoughts  of better off dead/self-harm past 2 weeks 0   PHQ-9 Total Score 0     ZURI-7  4/28/2022   1. Feeling nervous, anxious, or on edge 0   2. Not being able to stop or control worrying 0   3. Worrying too much about different things 0   4. Trouble relaxing 0   5. Being so restless that it is hard to sit still 0   6. Becoming easily annoyed or irritable 0   7. Feeling afraid, as if something awful might happen 0   ZURI-7 Total Score 0       Suicide Assessment Five-step Evaluation and Treatment (SAFE-T)      Abuse: Current or Past(Physical, Sexual or Emotional)- No  Do you feel safe in your environment? Yes    Have you ever done Advance Care Planning? (For example, a Health Directive, POLST, or a discussion with a medical provider or your loved ones about your wishes): No, advance care planning information given to patient to review.  Patient declined advance care planning discussion at this time.    Social History     Tobacco Use     Smoking status: Never Smoker     Smokeless tobacco: Never Used   Substance Use Topics     Alcohol use: Yes     Alcohol/week: 10.0 standard drinks     Types: 10 Standard drinks or equivalent per week     Comment: 1 per day      If you drink alcohol do you typically have >3 drinks per day or >7 drinks per week? No    Alcohol Use 4/28/2022   Prescreen: >3 drinks/day or >7 drinks/week? No   Prescreen: >3 drinks/day or >7 drinks/week? -   No flowsheet data found.    Last PSA:   PSA   Date Value Ref Range Status   05/30/2018 0.64 0 - 4 ug/L Final     Comment:     Assay Method:  Chemiluminescence using Siemens Vista analyzer       Reviewed orders with patient. Reviewed health maintenance and updated orders accordingly - Yes    Reviewed and updated as needed this visit by clinical staff   Tobacco  Allergies  Meds  Problems  Med Hx  Surg Hx  Fam Hx            Reviewed and updated as needed this visit by Provider   Tobacco  Allergies  Meds  Problems  Med Hx  Surg Hx  Fam Hx          "    Review of Systems   Constitutional: Negative for chills and fever.   HENT: Negative for congestion, ear pain, hearing loss and sore throat.    Eyes: Negative for pain and visual disturbance.   Respiratory: Negative for cough and shortness of breath.    Cardiovascular: Negative for chest pain, palpitations and peripheral edema.   Gastrointestinal: Positive for heartburn (- off and on and if he is dehydrated). Negative for abdominal pain, constipation, diarrhea, hematochezia and nausea.   Genitourinary: Negative for dysuria, frequency, genital sores, hematuria, impotence, penile discharge and urgency.   Musculoskeletal: Negative for arthralgias, joint swelling and myalgias.   Skin: Negative for rash.   Neurological: Negative for dizziness, weakness, headaches and paresthesias.   Psychiatric/Behavioral: Negative for mood changes. The patient is not nervous/anxious.        OBJECTIVE:   /74 (BP Location: Right arm, Patient Position: Sitting, Cuff Size: Adult Large)   Pulse 94   Temp (!) 96.1  F (35.6  C) (Tympanic)   Ht 1.854 m (6' 1\")   Wt 94.3 kg (208 lb)   SpO2 97%   BMI 27.44 kg/m    EXAM:  GENERAL: healthy, alert and no distress  EYES: Eyes grossly normal to inspection, PERRL and conjunctivae and sclerae normal  HENT: ear canals and TM's normal, nose and mouth without ulcers or lesions  NECK: no adenopathy, no asymmetry, masses, or scars and thyroid normal to palpation  RESP: lungs clear to auscultation - no rales, rhonchi or wheezes  BREAST: normal without masses, tenderness or nipple discharge and no palpable axillary masses or adenopathy  CV: regular rate and rhythm, normal S1 S2, no S3 or S4, no murmur, click or rub, no peripheral edema and peripheral pulses strong  ABDOMEN: soft, nontender, no hepatosplenomegaly, no masses and bowel sounds normal   (male): normal male genitalia without lesions or urethral discharge, no hernia  MS: no gross musculoskeletal defects noted, no edema  SKIN: no " "suspicious lesions or rashes  NEURO: Normal strength and tone, mentation intact and speech normal  PSYCH: mentation appears normal, affect normal/bright  LYMPH: no cervical, supraclavicular, axillary, or inguinal adenopathy  RECTAL: declined exam      ASSESSMENT/PLAN:   Routine general medical examination at a health care facility      Generalized anxiety disorder  ADD, predominantly inattentive type  Doing well overall.  Previous Adderall caused some elevation of blood pressure and heart rate and doing okay off of it.  - buPROPion (WELLBUTRIN XL) 300 MG 24 hr tablet  Dispense: 90 tablet; Refill: 3      Screen for colon cancer  - Adult Gastro Ref - Procedure Only    Screening for diabetes mellitus  - Glucose    Screening, deficiency anemia, iron  - Hemoglobin    Screening for lipid disorders  - Lipid panel reflex to direct LDL Fasting      COUNSELING:  Reviewed preventive health counseling, as reflected in patient instructions      Estimated body mass index is 27.44 kg/m  as calculated from the following:    Height as of this encounter: 1.854 m (6' 1\").    Weight as of this encounter: 94.3 kg (208 lb).  Weight management plan: Discussed healthy diet and exercise guidelines     reports that he has never smoked. He has never used smokeless tobacco.      Return in about 1 year (around 4/28/2023) for wellness exam with fasting labs with Stalin Gallegos MD.         Stalin Gallegos MD     [address] [address]  [email or web address]     Office: [phone]     Answers for HPI/ROS submitted by the patient on 4/28/2022  If you checked off any problems, how difficult have these problems made it for you to do your work, take care of things at home, or get along with other people?: Not difficult at all  PHQ9 TOTAL SCORE: 0  ZURI 7 TOTAL SCORE: 0      "

## 2022-04-29 ASSESSMENT — ANXIETY QUESTIONNAIRES: GAD7 TOTAL SCORE: 0

## 2022-04-29 ASSESSMENT — PATIENT HEALTH QUESTIONNAIRE - PHQ9: SUM OF ALL RESPONSES TO PHQ QUESTIONS 1-9: 0

## 2022-04-29 NOTE — RESULT ENCOUNTER NOTE
Dear Tejas,    Here is a summary of your recent test results:  -Hemoglobin is normal.  There is no evidence of anemia.  -Cholesterol levels (LDL,HDL, Triglycerides) are okay.  ADVISE: rechecking  in 1 year.  -2.3% of patients that have a similar cholesterol profile to you will have a stroke, heart attack or death (related to heart disease) within the next 10 years and that is considered a low risk and cholesterol lowering medications are not  recommended at this time (high risk is >10%, or >7.5% if other risk factors such has high blood pressure or other heart disease risk factors).    The ASCVD risk score (Madai THOMPSON Jr et al., 2013) returns the percentage likelihood of a first time Atherosclerotic Cardiovascular Disease (ASCVD) event.    The 10-year ASCVD risk score (Madai THOMPSON Jr., et al., 2013) is: 2.3%    Values used to calculate the score:      Age: 47 years      Sex: Male      Is Non- : No      Diabetic: No      Tobacco smoker: No      Systolic Blood Pressure: 110 mmHg      Is BP treated: No      HDL Cholesterol: 33 mg/dL      Total Cholesterol: 188 mg/dL    -Glucose is slight elevated and may be a sign of early diabetes (prediabetes). ADVISE:: eating a low carbohydrate diet, exercising, trying to lose weight (if necessary) and rechecking your glucose level in 12 months.    For additional lab test information, www.testing.com is a very good reference.    In addition, here is a list of due or overdue Health Maintenance reminders:  Colorectal Cancer Screening Never done    Please call us at [phone] (or use [employer]) to address the above recommendations if needed.           Thank you very much for trusting me and [facility].     Have a peaceful day.    Healthy regards,  Stalin Gallegos MD

## 2022-05-04 ENCOUNTER — HOSPITAL ENCOUNTER (OUTPATIENT)
Facility: CLINIC | Age: 47
End: 2022-05-04
Attending: INTERNAL MEDICINE | Admitting: INTERNAL MEDICINE
Payer: COMMERCIAL

## 2022-09-15 ENCOUNTER — TELEPHONE (OUTPATIENT)
Dept: FAMILY MEDICINE | Facility: CLINIC | Age: 47
End: 2022-09-15

## 2022-09-15 NOTE — TELEPHONE ENCOUNTER
Needs of attention regarding:  -Colon Cancer Screening    Health Maintenance Topics with due status: Overdue       Topic Date Due    COLORECTAL CANCER SCREENING Never done    INFLUENZA VACCINE 09/01/2022       Communication:  See Letter

## 2022-11-20 ENCOUNTER — HEALTH MAINTENANCE LETTER (OUTPATIENT)
Age: 47
End: 2022-11-20

## 2023-04-04 ENCOUNTER — TRANSFERRED RECORDS (OUTPATIENT)
Dept: HEALTH INFORMATION MANAGEMENT | Facility: CLINIC | Age: 48
End: 2023-04-04
Payer: COMMERCIAL

## 2023-04-20 ENCOUNTER — PATIENT OUTREACH (OUTPATIENT)
Dept: CARE COORDINATION | Facility: CLINIC | Age: 48
End: 2023-04-20
Payer: COMMERCIAL

## 2023-05-18 NOTE — PROGRESS NOTES
"Subjective     Terence Brewster is a 45 year old male who presents to clinic today for the following health issues:    HPI         ***  Onset/Duration: ***  Description:       Consistency of stool: {description:840974}       Blood in stool: {.:387807::\"no\"}       Number of loose stools past 24 hours: ***  Progression of Symptoms: {.:485534}  Accompanying signs and symptoms:       Fever: {.:152579::\"no\"}       Nausea/Vomiting: {.:717692::\"no\"}       Abdominal pain: {.:107159::\"no\"}       Weight loss: {.:686806::\"no\"}       Episodes of constipation: {.:336975::\"no\"}  History   Ill contacts: {.:907079::\"no\"}  Recent use of antibiotics: {.:008887::\"no\"}  Recent travels: {.:128302::\"no\"}  Recent medication-new or changes(Rx or OTC): {.:908303::\"no\"}  Precipitating or alleviating factors: {NONEORCHOOSE:078828::\"None\"}  Therapies tried and outcome: {DIARRHEA THERAPY 2:582921}    {additonal problems for provider to add (Optional):603195}    Review of Systems   {ROS COMP (Optional):812783}      Objective    There were no vitals taken for this visit.  There is no height or weight on file to calculate BMI.  Physical Exam   {Exam List (Optional):180094}    {Diagnostic Test Results (Optional):247034}        {PROVIDER CHARTING PREFERENCE:476356}    " Shower only

## 2023-05-27 ASSESSMENT — ENCOUNTER SYMPTOMS
HEMATOCHEZIA: 0
CHILLS: 0
HEMATURIA: 0
FREQUENCY: 0
JOINT SWELLING: 0
NAUSEA: 0
SORE THROAT: 0
SHORTNESS OF BREATH: 0
DIARRHEA: 0
ARTHRALGIAS: 0
ABDOMINAL PAIN: 0
MYALGIAS: 0
PALPITATIONS: 0
HEARTBURN: 1
COUGH: 0
PARESTHESIAS: 0
FEVER: 0
NERVOUS/ANXIOUS: 0
DIZZINESS: 0
DYSURIA: 0
CONSTIPATION: 0
EYE PAIN: 0
WEAKNESS: 0
HEADACHES: 0

## 2023-06-02 ENCOUNTER — HEALTH MAINTENANCE LETTER (OUTPATIENT)
Age: 48
End: 2023-06-02

## 2023-06-02 ENCOUNTER — OFFICE VISIT (OUTPATIENT)
Dept: FAMILY MEDICINE | Facility: CLINIC | Age: 48
End: 2023-06-02
Payer: COMMERCIAL

## 2023-06-02 VITALS
DIASTOLIC BLOOD PRESSURE: 74 MMHG | WEIGHT: 199.3 LBS | SYSTOLIC BLOOD PRESSURE: 120 MMHG | TEMPERATURE: 97.5 F | HEIGHT: 73 IN | HEART RATE: 90 BPM | BODY MASS INDEX: 26.41 KG/M2 | OXYGEN SATURATION: 96 % | RESPIRATION RATE: 16 BRPM

## 2023-06-02 DIAGNOSIS — F41.1 GAD (GENERALIZED ANXIETY DISORDER): ICD-10-CM

## 2023-06-02 DIAGNOSIS — R93.89 ABNORMAL CHEST X-RAY: ICD-10-CM

## 2023-06-02 DIAGNOSIS — Z80.8 FAMILY HISTORY OF MALIGNANT MELANOMA: ICD-10-CM

## 2023-06-02 DIAGNOSIS — R03.0 ELEVATED BLOOD PRESSURE READING WITHOUT DIAGNOSIS OF HYPERTENSION: ICD-10-CM

## 2023-06-02 DIAGNOSIS — Z00.00 ROUTINE GENERAL MEDICAL EXAMINATION AT A HEALTH CARE FACILITY: Primary | ICD-10-CM

## 2023-06-02 DIAGNOSIS — Z13.6 CARDIOVASCULAR SCREENING; LDL GOAL LESS THAN 160: ICD-10-CM

## 2023-06-02 DIAGNOSIS — Z78.9 HEPATITIS B VACCINATION STATUS UNKNOWN: ICD-10-CM

## 2023-06-02 DIAGNOSIS — F41.1 GENERALIZED ANXIETY DISORDER: ICD-10-CM

## 2023-06-02 DIAGNOSIS — Z12.5 SCREENING FOR PROSTATE CANCER: ICD-10-CM

## 2023-06-02 DIAGNOSIS — L57.8 SUN-DAMAGED SKIN: ICD-10-CM

## 2023-06-02 DIAGNOSIS — D12.6 SERRATED ADENOMA OF COLON: ICD-10-CM

## 2023-06-02 LAB
CHOLEST SERPL-MCNC: 187 MG/DL
CREAT UR-MCNC: 122 MG/DL
HDLC SERPL-MCNC: 32 MG/DL
LDLC SERPL CALC-MCNC: 111 MG/DL
MICROALBUMIN UR-MCNC: <12 MG/L
MICROALBUMIN/CREAT UR: NORMAL MG/G{CREAT}
NONHDLC SERPL-MCNC: 155 MG/DL
PSA SERPL DL<=0.01 NG/ML-MCNC: 0.78 NG/ML (ref 0–2.5)
TRIGL SERPL-MCNC: 218 MG/DL
TSH SERPL DL<=0.005 MIU/L-ACNC: 1.69 UIU/ML (ref 0.3–4.2)

## 2023-06-02 PROCEDURE — G0103 PSA SCREENING: HCPCS | Performed by: FAMILY MEDICINE

## 2023-06-02 PROCEDURE — 90715 TDAP VACCINE 7 YRS/> IM: CPT | Performed by: FAMILY MEDICINE

## 2023-06-02 PROCEDURE — 82570 ASSAY OF URINE CREATININE: CPT | Performed by: FAMILY MEDICINE

## 2023-06-02 PROCEDURE — 99396 PREV VISIT EST AGE 40-64: CPT | Mod: 25 | Performed by: FAMILY MEDICINE

## 2023-06-02 PROCEDURE — 80061 LIPID PANEL: CPT | Performed by: FAMILY MEDICINE

## 2023-06-02 PROCEDURE — 86706 HEP B SURFACE ANTIBODY: CPT | Performed by: FAMILY MEDICINE

## 2023-06-02 PROCEDURE — 82043 UR ALBUMIN QUANTITATIVE: CPT | Performed by: FAMILY MEDICINE

## 2023-06-02 PROCEDURE — 90471 IMMUNIZATION ADMIN: CPT | Performed by: FAMILY MEDICINE

## 2023-06-02 PROCEDURE — 99214 OFFICE O/P EST MOD 30 MIN: CPT | Mod: 25 | Performed by: FAMILY MEDICINE

## 2023-06-02 PROCEDURE — 36415 COLL VENOUS BLD VENIPUNCTURE: CPT | Performed by: FAMILY MEDICINE

## 2023-06-02 PROCEDURE — 84443 ASSAY THYROID STIM HORMONE: CPT | Performed by: FAMILY MEDICINE

## 2023-06-02 RX ORDER — BUPROPION HYDROCHLORIDE 150 MG/1
150 TABLET ORAL EVERY MORNING
Qty: 90 TABLET | Refills: 3 | Status: SHIPPED | OUTPATIENT
Start: 2023-06-02 | End: 2024-07-20

## 2023-06-02 RX ORDER — BUPROPION HYDROCHLORIDE 300 MG/1
300 TABLET ORAL EVERY MORNING
Qty: 90 TABLET | Refills: 3 | Status: SHIPPED | OUTPATIENT
Start: 2023-06-02 | End: 2024-07-20

## 2023-06-02 ASSESSMENT — ENCOUNTER SYMPTOMS
ARTHRALGIAS: 0
SHORTNESS OF BREATH: 0
FEVER: 0
ABDOMINAL PAIN: 0
FREQUENCY: 0
DYSURIA: 0
HEARTBURN: 1
DIARRHEA: 0
HEMATURIA: 0
NAUSEA: 0
MYALGIAS: 0
DIZZINESS: 0
HEADACHES: 0
WEAKNESS: 0
PARESTHESIAS: 0
CHILLS: 0
CONSTIPATION: 0
EYE PAIN: 0
COUGH: 0
NERVOUS/ANXIOUS: 0
JOINT SWELLING: 0
HEMATOCHEZIA: 0
PALPITATIONS: 0
SORE THROAT: 0

## 2023-06-02 NOTE — PROGRESS NOTES
"SUBJECTIVE:   CC: Tejas is an 48 year old who presents for preventative health visit and the following other medical problems:      1. Routine general medical examination at a health care facility    2. Generalized anxiety disorder    3. Abnormal chest x-ray    4. Serrated adenoma of colon- 2023 - needs repeat colonoscopy  in      5. Hepatitis B vaccination status unknown    6. Screening for prostate cancer    7. Elevated blood pressure reading without diagnosis of hypertension    8. CARDIOVASCULAR SCREENING; LDL GOAL LESS THAN 160    9. ZURI (generalized anxiety disorder)    10. Sun-damaged skin    11. Family history of malignant melanoma- brother age 42- just diagnosed - ear lobe             2023    10:20 AM   Additional Questions   Roomed by Lacie Dumont CMA   Accompanied by Self     Healthy Habits:     Getting at least 3 servings of Calcium per day:  Yes    Bi-annual eye exam:  Yes    Dental care twice a year:  Yes    Sleep apnea or symptoms of sleep apnea:  None    Diet:  Regular (no restrictions)    Frequency of exercise:  2-3 days/week    Duration of exercise:  15-30 minutes    Taking medications regularly:  Yes    Medication side effects:  None    PHQ-2 Total Score: 0    Additional concerns today:  Yes    BP's have been labile. Wasn't sleeping well for weeks when wife was in hospital for 1 week prior to newest baby's birth- Remarried in 2016 - Danielle ( has a horseshoe kidney - had to have nephrostomy tubes for all 3 pregnancies)  - has Silver female age 5, Susan age 18 months female, Tinley female  - preemie at 34 weeks. - 5lbs 6oz and 18 inches. Born 1 week ago.      intersitial prominence on CXR 2023 - work up on new onset hypertension at [facility] . Had a normal EKG. \" Prominent interstitium bilaterally is nonspecific but may be from infectious or inflammatory airway disease. \"  No chronic cough at all.  No chest pain or discomfort.     Prior to wife going into the hospital about 1.5 " month's ago, he was working out several times a week with a  with no problems or shortness of breath or chest pain.       CBC RESULTS: Recent Labs   Lab Test 04/28/22  0902 05/30/18  0933   WBC  --  4.9   RBC  --  5.91*   HGB 16.8 17.3   HCT  --  50.1   MCV  --  85   MCH  --  29.3   MCHC  --  34.5   RDW  --  14.3   PLT  --  158     Tinnitus - ever since COVID-19 early on in the pandemic spring 2020. Offered ENT referral - pt will think about it and let me know.      Had colonoscopy done on 4/4/2023.       Today's PHQ-2 Score:       6/2/2023     9:59 AM   PHQ-2 ( 1999 Pf[employer])   Q1: Little interest or pleasure in doing things 0   Q2: Feeling down, depressed or hopeless 0   PHQ-2 Score 0   Q1: Little interest or pleasure in doing things Not at all   Q2: Feeling down, depressed or hopeless Not at all   PHQ-2 Score 0           Social History     Tobacco Use     Smoking status: Never     Smokeless tobacco: Never   Vaping Use     Vaping status: Not on file   Substance Use Topics     Alcohol use: Yes     Alcohol/week: 10.0 standard drinks of alcohol     Types: 10 Standard drinks or equivalent per week     Comment: 1 per day              5/27/2023    12:23 PM   Alcohol Use   Prescreen: >3 drinks/day or >7 drinks/week? Yes   AUDIT SCORE  4         5/27/2023    12:23 PM   AUDIT - Alcohol Use Disorders Identification Test - Reproduced from the World Health Organization Audit 2001 (Second Edition)   1.  How often do you have a drink containing alcohol? 2 to 3 times a week   2.  How many drinks containing alcohol do you have on a typical day when you are drinking? 1 or 2   3.  How often do you have five or more drinks on one occasion? Less than monthly   4.  How often during the last year have you found that you were not able to stop drinking once you had started? Never   5.  How often during the last year have you failed to do what was normally expected of you because of drinking? Never   6.  How often during the  last year have you needed a first drink in the morning to get yourself going after a heavy drinking session? Never   7.  How often during the last year have you had a feeling of guilt or remorse after drinking? Never   8.  How often during the last year have you been unable to remember what happened the night before because of your drinking? Never   9.  Have you or someone else been injured because of your drinking? No   10. Has a relative, friend, doctor or other health care worker been concerned about your drinking or suggested you cut down? No   TOTAL SCORE 4       Last PSA:   PSA   Date Value Ref Range Status   05/30/2018 0.64 0 - 4 ug/L Final     Comment:     Assay Method:  Chemiluminescence using Siemens Vista analyzer       Reviewed orders with patient. 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 intradermal nevus - had as long as can remember      Adjustment disorder with mixed anxiety and depressed mood     Serrated adenoma of colon- 4/4/2023 - needs repeat colonoscopy  in 2028      Generalized anxiety disorder     Past Surgical History:   Procedure Laterality Date     HERNIA REPAIR, INGUINAL RT/LT Left 1978    left inguinal herniorrhaphy at 3 year of age     VASECTOMY  2005     vasectomy  2016    reversal       Social History     Tobacco Use     Smoking status: Never     Smokeless tobacco: Never   Vaping Use     Vaping status: Not on file   Substance Use Topics     Alcohol use: Yes     Alcohol/week: 10.0  standard drinks of alcohol     Types: 10 Standard drinks or equivalent per week     Comment: 1 per day      Family History   Problem Relation Age of Onset     Hyperlipidemia Father      Coronary Artery Disease Father 72        heart attack     No Known Problems Sister      Melanoma Brother      No Known Problems Brother      Prostate Cancer Paternal Grandfather      Cancer Paternal Grandfather      No Known Problems Daughter      No Known Problems Daughter      No Known Problems Daughter      No Known Problems Son      No Known Problems Son      No Known Problems Son      Diabetes Paternal Aunt      Colon Cancer No family hx of      Hypertension No family hx of      Cerebrovascular Disease No family hx of      Breast Cancer No family hx of          Current Outpatient Medications   Medication Sig Dispense Refill     buPROPion (WELLBUTRIN XL) 150 MG 24 hr tablet Take 1 tablet (150 mg) by mouth every morning In addition to 1-300mg tablet = 450mg total daily 90 tablet 3     buPROPion (WELLBUTRIN XL) 300 MG 24 hr tablet Take 1 tablet (300 mg) by mouth every morning 90 tablet 3     No Known Allergies  Recent Labs   Lab Test 04/28/22  0902 01/18/21  0950 05/30/18  0933 09/19/17  0841 08/30/16  1009 08/30/16  1009   * 117* 124* 109*   < >  --    HDL 33* 31* 29* 35*   < >  --    TRIG 250* 155* 266* 212*   < >  --    ALT  --   --  39 37  --  32   CR  --   --  1.05 1.07  --  1.03   GFRESTIMATED  --   --  78 76  --  80   GFRESTBLACK  --   --  >90 >90  --  >90  African American GFR Calc     POTASSIUM  --   --  4.5 4.8  --  4.9   TSH  --   --  1.57 2.18  --   --     < > = values in this interval not displayed.        Reviewed and updated as needed this visit by clinical staff   Tobacco  Allergies  Meds  Problems  Med Hx  Surg Hx  Fam Hx          Reviewed and updated as needed this visit by Provider   Tobacco  Allergies  Meds  Problems  Med Hx  Surg Hx  Fam Hx         Past Medical History:   Diagnosis Date      Chest pain     normal cardiac workup 2013     Closed fracture of head of radius      Elevated hemoglobin (H)     unclear etiology     Grief reaction- to wife's death 11/9/2013 from cholangiocarcinoma- ongoing 6/3/2014     Heart palpitations      Lightheadedness       Past Surgical History:   Procedure Laterality Date     HERNIA REPAIR, INGUINAL RT/LT Left 1978    left inguinal herniorrhaphy at 3 year of age     VASECTOMY  2005     vasectomy  2016    reversal       Review of Systems   Constitutional: Negative for chills and fever.   HENT: Negative for congestion, ear pain, hearing loss and sore throat.    Eyes: Negative for pain and visual disturbance.   Respiratory: Negative for cough and shortness of breath.    Cardiovascular: Negative for chest pain, palpitations and peripheral edema.   Gastrointestinal: Positive for heartburn. Negative for abdominal pain, constipation, diarrhea, hematochezia and nausea.   Genitourinary: Negative for dysuria, frequency, genital sores, hematuria, impotence, penile discharge and urgency.   Musculoskeletal: Negative for arthralgias, joint swelling and myalgias.   Skin: Negative for rash.   Neurological: Negative for dizziness, weakness, headaches and paresthesias.   Psychiatric/Behavioral: Negative for mood changes. The patient is not nervous/anxious.      CONSTITUTIONAL: NEGATIVE for fever, chills, change in weight  INTEGUMENTARY/SKIN: NEGATIVE for worrisome rashes, moles or lesions  EYES: NEGATIVE for vision changes or irritation  ENT: NEGATIVE for ear, mouth and throat problems  RESP: NEGATIVE for significant cough or SOB  CV: NEGATIVE for chest pain, palpitations or peripheral edema  GI: NEGATIVE for nausea, abdominal pain, heartburn, or change in bowel habits   male: negative for dysuria, hematuria, decreased urinary stream, erectile dysfunction, urethral discharge  MUSCULOSKELETAL: NEGATIVE for significant arthralgias or myalgia  NEURO: NEGATIVE for weakness, dizziness or  "paresthesias  PSYCHIATRIC: NEGATIVE for changes in mood or affect    OBJECTIVE:   /74   Pulse 90   Temp 97.5  F (36.4  C) (Tympanic)   Resp 16   Ht 1.854 m (6' 1\")   Wt 90.4 kg (199 lb 4.8 oz)   SpO2 96%   BMI 26.29 kg/m      Physical Exam  GENERAL: healthy, alert and no distress  EYES: Eyes grossly normal to inspection, PERRL and conjunctivae and sclerae normal  HENT: ear canals and TM's normal, nose and mouth without ulcers or lesions  NECK: no adenopathy, no asymmetry, masses, or scars and thyroid normal to palpation  RESP: lungs clear to auscultation - no rales, rhonchi or wheezes  CV: regular rate and rhythm, normal S1 S2, no S3 or S4, no murmur, click or rub, no peripheral edema and peripheral pulses strong  ABDOMEN: soft, nontender, no hepatosplenomegaly, no masses and bowel sounds normal   (male): normal male genitalia without lesions or urethral discharge, no hernia  MS: no gross musculoskeletal defects noted, no edema  SKIN: no suspicious lesions or rashes, mildly sun-damaged skin   NEURO: Normal strength and tone, mentation intact and speech normal  PSYCH: mentation appears normal, affect normal/bright    Note:pt declined a chaperone for the genital and rectal exams. --Candace Gomez MD       Diagnostic Test Results:  Labs reviewed in Epic    ASSESSMENT/PLAN:       ICD-10-CM    1. Routine general medical examination at a health care facility  Z00.00       2. Generalized anxiety disorder  F41.1 buPROPion (WELLBUTRIN XL) 300 MG 24 hr tablet      3. Abnormal chest x-ray  R93.89 XR Chest 2 Views      4. Serrated adenoma of colon- 4/4/2023 - needs repeat colonoscopy  in 2028   D12.6       5. Hepatitis B vaccination status unknown  Z78.9 Hepatitis B Surface Antibody     Hepatitis B Surface Antibody      6. Screening for prostate cancer  Z12.5 Prostate Specific Antigen Screen     Prostate Specific Antigen Screen      7. Elevated blood pressure reading without diagnosis of hypertension  " R03.0 TSH with free T4 reflex     Albumin Random Urine Quantitative with Creat Ratio     TSH with free T4 reflex     Albumin Random Urine Quantitative with Creat Ratio      8. CARDIOVASCULAR SCREENING; LDL GOAL LESS THAN 160  Z13.6 REVIEW OF HEALTH MAINTENANCE PROTOCOL ORDERS     Lipid panel reflex to direct LDL Fasting     Lipid panel reflex to direct LDL Fasting      9. ZURI (generalized anxiety disorder)  F41.1 buPROPion (WELLBUTRIN XL) 300 MG 24 hr tablet     buPROPion (WELLBUTRIN XL) 150 MG 24 hr tablet      10. Sun-damaged skin  L57.8 Adult Dermatology Referral      11. Family history of malignant melanoma- brother age 42- just diagnosed - ear lobe   Z80.8 Adult Dermatology Referral          Patient has been advised of split billing requirements and indicates understanding: Yes      COUNSELING:   Reviewed preventive health counseling, as reflected in patient instructions        He reports that he has never smoked. He has never used smokeless tobacco.             Candace Gomez MD  [facility] [place]

## 2023-06-02 NOTE — PATIENT INSTRUCTIONS
[facility]  [address] [place] [address]  Office: [phone]   Fax:    [phone]     Return in about 1 month (around 7/2/2023) for recheck chest xray as xray only appointment, then 1 year for , Wellness/Preventative Visit, w/ Dr KHADRA Platt    Preventive Health Recommendations  Male Ages 40 to 49    Yearly exam:             See your health care provider every year in order to  o   Review health changes.   o   Discuss preventive care.    o   Review your medicines if your doctor has prescribed any.  You should be tested each year for STDs (sexually transmitted diseases) if you re at risk.   Have a cholesterol test every 5 years.   Have a colonoscopy (test for colon cancer) if someone in your family has had colon cancer or polyps before age 50.   After age 45, have a diabetes test (fasting glucose). If you are at risk for diabetes, you should have this test every 3 years.    Talk with your health care provider about whether or not a prostate cancer screening test (PSA) is right for you.    Shots: Get a flu shot each year. Get a tetanus shot every 10 years.     Nutrition:  Eat at least 5 servings of fruits and vegetables daily.   Eat whole-grain bread, whole-wheat pasta and brown rice instead of white grains and rice.   Get adequate Calcium and Vitamin D.     Lifestyle  Exercise for at least 150 minutes a week (30 minutes a day, 5 days a week). This will help you control your weight and prevent disease.   Limit alcohol to one drink per day.   No smoking.   Wear sunscreen to prevent skin cancer.   See your dentist every six months for an exam and cleaning.   Thank you so much or choosing [facility]  for your Health Care. It was a pleasure seeing you at your visit today! Please contact us with any questions or concerns you may have.                   Candace Gomez MD                              To reach your [facility]  "care team after hours call:   [phone] press #2 \"to speak with your care team\".  This will get you to our clinic instead of routing to [facility]  scheduling.     PLEASE NOTE OUR HOURS HAVE CHANGED secondary to COVID-19 coronavirus pandemic, as we are trying to minimize patient exposure to the virus,  which is now widespread in the state.  These hours may change with very little notice.  We apologize for any inconvenience.       Our current clinic hours are:          Monday- Thursday   7:00am - 6:00pm  in person.      Friday  7:00am- 5:00pm                       Saturday and Sunday : Closed to in person and virtual visits        We have telephone and virtual visit times available between    7:00am - 6pm on Monday-Friday as well.                                                Phone:  [phone]      Our pharmacy hours: Monday through Friday 8:00am to 5:00pm                        Saturday - 9:00 am to 12 noon       Sunday : Closed.              Phone:  [phone]              ###  Please note: at this time we are not accepting any walk-in visits. ###      There is also information available at our web site:  [email or web address]    If your provider ordered any lab tests and you do not receive the results within 10 business days, please call the clinic.    If you need a medication refill please contact your pharmacy.  Please allow 3 business days for your refill to be completed.    Our clinic offers telephone visits and e visits.  Please ask one of your team members to explain more.      Use [employer] (secure email communication and access to your chart) to send your primary care provider a message or make an appointment. Ask someone on your Team how to sign up for [employer].                "

## 2023-06-03 LAB
HBV SURFACE AB SERPL IA-ACNC: 0 M[IU]/ML
HBV SURFACE AB SERPL IA-ACNC: NONREACTIVE M[IU]/ML

## 2023-06-26 ENCOUNTER — ANCILLARY PROCEDURE (OUTPATIENT)
Dept: GENERAL RADIOLOGY | Facility: CLINIC | Age: 48
End: 2023-06-26
Attending: FAMILY MEDICINE
Payer: COMMERCIAL

## 2023-06-26 DIAGNOSIS — R93.89 ABNORMAL CHEST X-RAY: ICD-10-CM

## 2023-06-26 PROCEDURE — 71046 X-RAY EXAM CHEST 2 VIEWS: CPT | Mod: TC | Performed by: RADIOLOGY

## 2023-06-26 NOTE — RESULT ENCOUNTER NOTE
Tejas  I have reviewed your recent test results:    Great news! The follow-up x-ray to investigate the mentioned abnormalities on previous imaging is completely normal.  No follow-up is needed unless symptoms necessitate in the future.    For additional lab test information, [email or web address] is an excellent reference.     If you have any questions please do not hesitate to contact our office via phone ([phone]) or MyChart.    Healthy regards,     Toña Arias MBA, MS, PA-C (covering for Dr. Gomez)  [facility]

## 2023-11-01 ENCOUNTER — PATIENT OUTREACH (OUTPATIENT)
Dept: GASTROENTEROLOGY | Facility: CLINIC | Age: 48
End: 2023-11-01
Payer: COMMERCIAL

## 2023-11-02 ENCOUNTER — OFFICE VISIT (OUTPATIENT)
Dept: FAMILY MEDICINE | Facility: CLINIC | Age: 48
End: 2023-11-02
Attending: FAMILY MEDICINE
Payer: COMMERCIAL

## 2023-11-02 DIAGNOSIS — L81.4 LENTIGINES: ICD-10-CM

## 2023-11-02 DIAGNOSIS — Z80.8 FAMILY HISTORY OF MALIGNANT MELANOMA: ICD-10-CM

## 2023-11-02 DIAGNOSIS — D23.9 ANGIOKERATOMA: ICD-10-CM

## 2023-11-02 DIAGNOSIS — D22.9 MULTIPLE BENIGN NEVI: ICD-10-CM

## 2023-11-02 DIAGNOSIS — D18.01 CHERRY ANGIOMA: ICD-10-CM

## 2023-11-02 DIAGNOSIS — L57.8 SUN-DAMAGED SKIN: ICD-10-CM

## 2023-11-02 DIAGNOSIS — L82.1 SEBORRHEIC KERATOSES: Primary | ICD-10-CM

## 2023-11-02 PROCEDURE — 99203 OFFICE O/P NEW LOW 30 MIN: CPT | Performed by: PHYSICIAN ASSISTANT

## 2023-11-02 ASSESSMENT — PAIN SCALES - GENERAL: PAINLEVEL: NO PAIN (0)

## 2023-11-02 NOTE — PATIENT INSTRUCTIONS
Patient Education       Proper skin care from [facility]:    -Eliminate harsh soaps as they strip the natural oils from the skin, often resulting in dry itchy skin ( i.e. Dial, Zest, Syriac Spring)  -Use mild soaps such as Cetaphil or Dove Sensitive Skin in the shower. You do not need to use soap on arms, legs, and trunk every time you shower unless visibly soiled.   -Avoid hot or cold showers.  -After showering, lightly dry off and apply moisturizing within 2-3 minutes. This will help trap moisture in the skin.   -Aggressive use of a moisturizer at least 1-2 times a day to the entire body (including -Vanicream, Cetaphil, Aquaphor or Cerave) and moisturize hands after every washing.  -We recommend using moisturizers that come in a tub that needs to be scooped out, not a pump. This has more of an oil base. It will hold moisture in your skin much better than a water base moisturizer. The above recommended are non-pore clogging.      Wear a sunscreen with at least SPF 30 on your face, ears, neck and V of the chest daily. Wear sunscreen on other areas of the body if those areas are exposed to the sun throughout the day. Sunscreens can contain physical and/or chemical blockers. Physical blockers are less likely to clog pores, these include zinc oxide and titanium dioxide. Reapply every two hour and after swimming.     Sunscreen examples: https://www.ewg.org/sunscreen/    UV radiation  UVA radiation remains constant throughout the day and throughout the year. It is a longer wavelength than UVB and therefore penetrates deeper into the skin leading to immediate and delayed tanning, photoaging, and skin cancer. 70-80% of UVA and UVB radiation occurs between the hours of 10am-2pm.  UVB radiation  UVB radiation causes the most harmful effects and is more significant during the summer months. However, snow and ice can reflect UVB radiation leading to skin damage during the winter months as well. UVB radiation is  responsible for tanning, burning, inflammation, delayed erythema (pinkness), pigmentation (brown spots), and skin cancer.     I recommend self monthly full body exams and yearly full body exams with a dermatology provider. If you develop a new or changing lesion please follow up for examination. Most skin cancers are pink and scaly or pink and pearly. However, we do see blue/brown/black skin cancers.  Consider the ABCDEs of melanoma when giving yourself your monthly full body exam ( don't forget the groin, buttocks, feet, toes, etc). A-asymmetry, B-borders, C-color, D-diameter, E-elevation or evolving. If you see any of these changes please follow up in clinic. If you cannot see your back I recommend purchasing a hand held mirror to use with a larger wall mirror.       Checking for Skin Cancer  You can find cancer early by checking your skin each month. There are 3 kinds of skin cancer. They are melanoma, basal cell carcinoma, and squamous cell carcinoma. Doing monthly skin checks is the best way to find new marks or skin changes. Follow the instructions below for checking your skin.   The ABCDEs of checking moles for melanoma   Check your moles or growths for signs of melanoma using ABCDE:   Asymmetry: the sides of the mole or growth don t match  Border: the edges are ragged, notched, or blurred  Color: the color within the mole or growth varies  Diameter: the mole or growth is larger than 6 mm (size of a pencil eraser)  Evolving: the size, shape, or color of the mole or growth is changing (evolving is not shown in the images below)    Checking for other types of skin cancer  Basal cell carcinoma or squamous cell carcinoma have symptoms such as:     A spot or mole that looks different from all other marks on your skin  Changes in how an area feels, such as itching, tenderness, or pain  Changes in the skin's surface, such as oozing, bleeding, or scaliness  A sore that does not heal  New swelling or redness beyond  the border of a mole    Who s at risk?  Anyone can get skin cancer. But you are at greater risk if you have:   Fair skin, light-colored hair, or light-colored eyes  Many moles or abnormal moles on your skin  A history of sunburns from sunlight or tanning beds  A family history of skin cancer  A history of exposure to radiation or chemicals  A weakened immune system  If you have had skin cancer in the past, you are at risk for recurring skin cancer.   How to check your skin  Do your monthly skin checkups in front of a full-length mirror. Check all parts of your body, including your:   Head (ears, face, neck, and scalp)  Torso (front, back, and sides)  Arms (tops, undersides, upper, and lower armpits)  Hands (palms, backs, and fingers, including under the nails)  Buttocks and genitals  Legs (front, back, and sides)  Feet (tops, soles, toes, including under the nails, and between toes)  If you have a lot of moles, take digital photos of them each month. Make sure to take photos both up close and from a distance. These can help you see if any moles change over time.   Most skin changes are not cancer. But if you see any changes in your skin, call your doctor right away. Only he or she can diagnose a problem. If you have skin cancer, seeing your doctor can be the first step toward getting the treatment that could save your life.   [employer] last reviewed this educational content on 4/1/2019 2000-2020 [employer]. [address] [place] [address], [place] [address]. All rights reserved. This information is not intended as a substitute for professional medical care. Always follow your healthcare professional's instructions.       When should I call my doctor?  If you are worsening or not improving, please, contact us or seek urgent care as noted below.     Who should I call with questions (adults)?  [facility] (adult and pediatric): [phone]  [facility]  [place] (adult): [phone]  [facility] ([place], [place], [place] and [place]) [phone]  For urgent needs outside of business hours call the [facility] at [phone] and ask for the dermatology resident on call to be paged  If this is a medical emergency and you are unable to reach an ER, Call 911      If you need a prescription refill, please contact your pharmacy. Refills are approved or denied by our Physicians during normal business hours, Monday through Fridays  Per office policy, refills will not be granted if you have not been seen within the past year (or sooner depending on your child's condition)

## 2023-11-02 NOTE — LETTER
11/2/2023         RE: Terence Brewster  [address] [address]        Dear Colleague,    Thank you for referring your patient, Terence Brewster, to the [facility]. Please see a copy of my visit note below.    [facility] Dermatology Note  Encounter Date: Nov 2, 2023  Office Visit      Dermatology Problem List:  FBSE 11/2/23  Angiokeratoma - scrotum    FHx: melanoma - brother  ____________________________________________    Assessment & Plan:  # Family hx of MM, younger brother  -Signs and Symptoms of non-melanoma skin cancer and ABCDEs of melanoma reviewed with patient. Patient encouraged to perform monthly self skin exams and educated on how to perform them. UV precautions reviewed with patient. Patient was asked about new or changing moles/lesions on body.   - Sunscreen: Apply 20 minutes prior to going outdoors and reapply every two hours, when wet or sweating. We recommend using an SPF 30 or higher, and to use one that is water resistant.    - edu on increased risk for melanoma for him, recommend annual skin exams going forward    # Benign findings: multiple benign nevi, lentigines, cherry angiomas, SKs  - edu on benign etiology  - Signs and Symptoms of non-melanoma skin cancer and ABCDEs of melanoma reviewed with patient. Patient encouraged to perform monthly self skin exams and educated on how to perform them. UV precautions reviewed with patient. Patient was asked about new or changing moles/lesions on body.   - Sunscreen: Apply 20 minutes prior to going outdoors and reapply every two hours, when wet or sweating. We recommend using an SPF 30 or higher, and to use one that is water resistant.     - RTC for changes    # Angiokeratoma - scrotum  - reassured benign, discussed etiology    Procedures Performed:   None    Follow-up: 1 year(s) in-person, or earlier for new or changing lesions    Staff and scribe.     Scribe disclosure  I,  Trupti Manzo, am serving as a scribe to document services personally performed by Samira Jefferson PA-C based on data collection and the provider's statements to me.     All risks, benefits and alternatives were discussed with patient.  Patient is in agreement and understands the assessment and plan.  All questions were answered.    Samira Jefferson PA-C, MPAS  [facility]: Phone: [phone], Fax: [phone]  [facility]: Phone: [phone],  Fax: [phone]  [facility]: Phone: [phone], Fax: [phone]  ____________________________________________    CC: Skin Check (FBSE)      HPI:  Mr. Terence Brewster is a 48 year old male who presents today as a new patient for a FBSE.     Today, patient reports that he recently had a vasectomy and noticed some new spots in his groan area.    Family hx of skin cancer: Brother, MM.    Patient is otherwise feeling well, without additional concerns.    Labs:  None    Physical Exam:  Vitals: There were no vitals taken for this visit.  SKIN: Full skin, which includes the head/face, both arms, chest, back, abdomen,both legs, genitalia and/or groin buttocks, digits and/or nails, was examined.   - Gonzalez's skin type II, Has <100 nevi  - There are dome shaped bright red papules on the trunk. He also specifically has about 4 on the L side of the scrotum which are more violaceous in color.  - Multiple regular brown pigmented macules and papules are identified on the trunk and extremities.   - Scattered brown macules on sun exposed areas.  - There are waxy stuck on tan to brown papules on the trunk.   - No other lesions of concern on areas examined.     Medications:  Current Outpatient Medications   Medication     buPROPion (WELLBUTRIN XL) 150 MG 24 hr tablet     buPROPion (WELLBUTRIN XL) 300 MG 24 hr tablet     No current facility-administered medications for  this visit.      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 intradermal nevus - had as long as can remember      Adjustment disorder with mixed anxiety and depressed mood     Serrated adenoma of colon- 4/4/2023 - needs repeat colonoscopy  in 2028      Generalized anxiety disorder     Past Medical History:   Diagnosis Date     Chest pain     normal cardiac workup 2013     Closed fracture of head of radius      Elevated hemoglobin (H24)     unclear etiology     Grief reaction- to first wife's death 11/9/2013 from cholangiocarcinoma- ongoing 06/03/2014     Heart palpitations      Lightheadedness                           Again, thank you for allowing me to participate in the care of your patient.        Sincerely,        Samira Jefferson PA-C

## 2023-11-02 NOTE — PROGRESS NOTES
[facility] Dermatology Note  Encounter Date: Nov 2, 2023  Office Visit      Dermatology Problem List:  FBSE 11/2/23  Angiokeratoma - scrotum    FHx: melanoma - brother  ____________________________________________    Assessment & Plan:  # Family hx of MM, younger brother  -Signs and Symptoms of non-melanoma skin cancer and ABCDEs of melanoma reviewed with patient. Patient encouraged to perform monthly self skin exams and educated on how to perform them. UV precautions reviewed with patient. Patient was asked about new or changing moles/lesions on body.   - Sunscreen: Apply 20 minutes prior to going outdoors and reapply every two hours, when wet or sweating. We recommend using an SPF 30 or higher, and to use one that is water resistant.    - edu on increased risk for melanoma for him, recommend annual skin exams going forward    # Benign findings: multiple benign nevi, lentigines, cherry angiomas, SKs  - edu on benign etiology  - Signs and Symptoms of non-melanoma skin cancer and ABCDEs of melanoma reviewed with patient. Patient encouraged to perform monthly self skin exams and educated on how to perform them. UV precautions reviewed with patient. Patient was asked about new or changing moles/lesions on body.   - Sunscreen: Apply 20 minutes prior to going outdoors and reapply every two hours, when wet or sweating. We recommend using an SPF 30 or higher, and to use one that is water resistant.     - RTC for changes    # Angiokeratoma - scrotum  - reassured benign, discussed etiology    Procedures Performed:   None    Follow-up: 1 year(s) in-person, or earlier for new or changing lesions    Staff and scribe.     Scribe disclosure  I, Trupti Manzo, am serving as a scribe to document services personally performed by Samira Jefferson PA-C based on data collection and the provider's statements to me.     All risks, benefits and alternatives were discussed with patient.  Patient is in agreement and  understands the assessment and plan.  All questions were answered.    Samira Jefferson PA-C, MPAS  [facility]: Phone: [phone], Fax: [phone]  [facility]: Phone: [phone],  Fax: [phone]  [facility]: Phone: [phone], Fax: [phone]  ____________________________________________    CC: Skin Check (FBSE)      HPI:  Mr. Terence Brewster is a 48 year old male who presents today as a new patient for a FBSE.     Today, patient reports that he recently had a vasectomy and noticed some new spots in his groan area.    Family hx of skin cancer: Brother, MM.    Patient is otherwise feeling well, without additional concerns.    Labs:  None    Physical Exam:  Vitals: There were no vitals taken for this visit.  SKIN: Full skin, which includes the head/face, both arms, chest, back, abdomen,both legs, genitalia and/or groin buttocks, digits and/or nails, was examined.   - Gonzalez's skin type II, Has <100 nevi  - There are dome shaped bright red papules on the trunk. He also specifically has about 4 on the L side of the scrotum which are more violaceous in color.  - Multiple regular brown pigmented macules and papules are identified on the trunk and extremities.   - Scattered brown macules on sun exposed areas.  - There are waxy stuck on tan to brown papules on the trunk.   - No other lesions of concern on areas examined.     Medications:  Current Outpatient Medications   Medication    buPROPion (WELLBUTRIN XL) 150 MG 24 hr tablet    buPROPion (WELLBUTRIN XL) 300 MG 24 hr tablet     No current facility-administered medications for this visit.      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 intradermal nevus -  had as long as can remember     Adjustment disorder with mixed anxiety and depressed mood    Serrated adenoma of colon- 4/4/2023 - needs repeat colonoscopy  in 2028     Generalized anxiety disorder     Past Medical History:   Diagnosis Date    Chest pain     normal cardiac workup 2013    Closed fracture of head of radius     Elevated hemoglobin (H24)     unclear etiology    Grief reaction- to first wife's death 11/9/2013 from cholangiocarcinoma- ongoing 06/03/2014    Heart palpitations     Lightheadedness

## 2024-05-03 ENCOUNTER — PATIENT OUTREACH (OUTPATIENT)
Dept: CARE COORDINATION | Facility: CLINIC | Age: 49
End: 2024-05-03
Payer: COMMERCIAL

## 2024-05-17 ENCOUNTER — PATIENT OUTREACH (OUTPATIENT)
Dept: CARE COORDINATION | Facility: CLINIC | Age: 49
End: 2024-05-17
Payer: COMMERCIAL

## 2024-07-16 ENCOUNTER — MYC REFILL (OUTPATIENT)
Dept: FAMILY MEDICINE | Facility: CLINIC | Age: 49
End: 2024-07-16
Payer: COMMERCIAL

## 2024-07-16 DIAGNOSIS — F41.1 GAD (GENERALIZED ANXIETY DISORDER): ICD-10-CM

## 2024-07-16 DIAGNOSIS — F41.1 GENERALIZED ANXIETY DISORDER: ICD-10-CM

## 2024-07-17 ENCOUNTER — MYC MEDICAL ADVICE (OUTPATIENT)
Dept: FAMILY MEDICINE | Facility: CLINIC | Age: 49
End: 2024-07-17
Payer: COMMERCIAL

## 2024-07-17 ASSESSMENT — ANXIETY QUESTIONNAIRES
8. IF YOU CHECKED OFF ANY PROBLEMS, HOW DIFFICULT HAVE THESE MADE IT FOR YOU TO DO YOUR WORK, TAKE CARE OF THINGS AT HOME, OR GET ALONG WITH OTHER PEOPLE?: NOT DIFFICULT AT ALL
6. BECOMING EASILY ANNOYED OR IRRITABLE: NOT AT ALL
GAD7 TOTAL SCORE: 1
7. FEELING AFRAID AS IF SOMETHING AWFUL MIGHT HAPPEN: NOT AT ALL
5. BEING SO RESTLESS THAT IT IS HARD TO SIT STILL: NOT AT ALL
2. NOT BEING ABLE TO STOP OR CONTROL WORRYING: NOT AT ALL
IF YOU CHECKED OFF ANY PROBLEMS ON THIS QUESTIONNAIRE, HOW DIFFICULT HAVE THESE PROBLEMS MADE IT FOR YOU TO DO YOUR WORK, TAKE CARE OF THINGS AT HOME, OR GET ALONG WITH OTHER PEOPLE: NOT DIFFICULT AT ALL
1. FEELING NERVOUS, ANXIOUS, OR ON EDGE: SEVERAL DAYS
3. WORRYING TOO MUCH ABOUT DIFFERENT THINGS: NOT AT ALL
GAD7 TOTAL SCORE: 1
GAD7 TOTAL SCORE: 1
7. FEELING AFRAID AS IF SOMETHING AWFUL MIGHT HAPPEN: NOT AT ALL
4. TROUBLE RELAXING: NOT AT ALL

## 2024-07-17 ASSESSMENT — PATIENT HEALTH QUESTIONNAIRE - PHQ9
SUM OF ALL RESPONSES TO PHQ QUESTIONS 1-9: 1
SUM OF ALL RESPONSES TO PHQ QUESTIONS 1-9: 1
10. IF YOU CHECKED OFF ANY PROBLEMS, HOW DIFFICULT HAVE THESE PROBLEMS MADE IT FOR YOU TO DO YOUR WORK, TAKE CARE OF THINGS AT HOME, OR GET ALONG WITH OTHER PEOPLE: NOT DIFFICULT AT ALL

## 2024-07-20 ENCOUNTER — MYC REFILL (OUTPATIENT)
Dept: FAMILY MEDICINE | Facility: CLINIC | Age: 49
End: 2024-07-20
Payer: COMMERCIAL

## 2024-07-20 DIAGNOSIS — F41.1 GAD (GENERALIZED ANXIETY DISORDER): ICD-10-CM

## 2024-07-20 DIAGNOSIS — F41.1 GENERALIZED ANXIETY DISORDER: ICD-10-CM

## 2024-07-20 RX ORDER — BUPROPION HYDROCHLORIDE 300 MG/1
300 TABLET ORAL EVERY MORNING
Qty: 90 TABLET | Refills: 3 | OUTPATIENT
Start: 2024-07-20

## 2024-07-20 RX ORDER — BUPROPION HYDROCHLORIDE 150 MG/1
150 TABLET ORAL EVERY MORNING
Qty: 90 TABLET | Refills: 3 | OUTPATIENT
Start: 2024-07-20

## 2024-07-22 RX ORDER — BUPROPION HYDROCHLORIDE 150 MG/1
150 TABLET ORAL EVERY MORNING
Qty: 90 TABLET | Refills: 3 | OUTPATIENT
Start: 2024-07-22

## 2024-07-22 RX ORDER — BUPROPION HYDROCHLORIDE 300 MG/1
300 TABLET ORAL EVERY MORNING
Qty: 90 TABLET | Refills: 3 | OUTPATIENT
Start: 2024-07-22

## 2024-08-31 ENCOUNTER — HEALTH MAINTENANCE LETTER (OUTPATIENT)
Age: 49
End: 2024-08-31

## 2025-01-02 ENCOUNTER — APPOINTMENT (OUTPATIENT)
Dept: URBAN - METROPOLITAN AREA CLINIC 256 | Age: 50
Setting detail: DERMATOLOGY
End: 2025-01-02

## 2025-01-02 DIAGNOSIS — D18.0 HEMANGIOMA: ICD-10-CM

## 2025-01-02 DIAGNOSIS — Z71.89 OTHER SPECIFIED COUNSELING: ICD-10-CM

## 2025-01-02 DIAGNOSIS — D22 MELANOCYTIC NEVI: ICD-10-CM

## 2025-01-02 DIAGNOSIS — Z80.8 FAMILY HISTORY OF MALIGNANT NEOPLASM OF OTHER ORGANS OR SYSTEMS: ICD-10-CM

## 2025-01-02 DIAGNOSIS — L57.8 OTHER SKIN CHANGES DUE TO CHRONIC EXPOSURE TO NONIONIZING RADIATION: ICD-10-CM

## 2025-01-02 DIAGNOSIS — L82.1 OTHER SEBORRHEIC KERATOSIS: ICD-10-CM

## 2025-01-02 PROBLEM — D22.5 MELANOCYTIC NEVI OF TRUNK: Status: ACTIVE | Noted: 2025-01-02

## 2025-01-02 PROBLEM — D22.39 MELANOCYTIC NEVI OF OTHER PARTS OF FACE: Status: ACTIVE | Noted: 2025-01-02

## 2025-01-02 PROBLEM — D18.01 HEMANGIOMA OF SKIN AND SUBCUTANEOUS TISSUE: Status: ACTIVE | Noted: 2025-01-02

## 2025-01-02 PROCEDURE — OTHER SUNSCREEN RECOMMENDATIONS: OTHER

## 2025-01-02 PROCEDURE — OTHER MIPS QUALITY: OTHER

## 2025-01-02 PROCEDURE — OTHER ADDITIONAL NOTES: OTHER

## 2025-01-02 PROCEDURE — OTHER COUNSELING: OTHER

## 2025-01-02 PROCEDURE — 99213 OFFICE O/P EST LOW 20 MIN: CPT

## 2025-01-02 ASSESSMENT — LOCATION SIMPLE DESCRIPTION DERM
LOCATION SIMPLE: RIGHT CHEEK
LOCATION SIMPLE: ABDOMEN
LOCATION SIMPLE: LOWER BACK
LOCATION SIMPLE: LEFT CHEEK
LOCATION SIMPLE: UPPER BACK

## 2025-01-02 ASSESSMENT — LOCATION DETAILED DESCRIPTION DERM
LOCATION DETAILED: RIGHT INFERIOR PREAURICULAR CHEEK
LOCATION DETAILED: SUPERIOR THORACIC SPINE
LOCATION DETAILED: SUPERIOR LUMBAR SPINE
LOCATION DETAILED: EPIGASTRIC SKIN
LOCATION DETAILED: LEFT MID PREAURICULAR CHEEK

## 2025-01-02 ASSESSMENT — LOCATION ZONE DERM
LOCATION ZONE: TRUNK
LOCATION ZONE: FACE